# Patient Record
Sex: FEMALE | Race: BLACK OR AFRICAN AMERICAN | Employment: UNEMPLOYED | URBAN - METROPOLITAN AREA
[De-identification: names, ages, dates, MRNs, and addresses within clinical notes are randomized per-mention and may not be internally consistent; named-entity substitution may affect disease eponyms.]

---

## 2019-11-20 PROBLEM — Z34.81 MULTIGRAVIDA IN FIRST TRIMESTER: Status: ACTIVE | Noted: 2019-11-20

## 2019-11-20 PROBLEM — E07.9 THYROID DISEASE AFFECTING PREGNANCY: Status: ACTIVE | Noted: 2019-11-20

## 2019-11-20 PROBLEM — O99.280 THYROID DISEASE AFFECTING PREGNANCY: Status: ACTIVE | Noted: 2019-11-20

## 2019-11-22 PROBLEM — O09.899 RUBELLA NON-IMMUNE STATUS, ANTEPARTUM: Status: ACTIVE | Noted: 2019-11-22

## 2019-11-22 PROBLEM — O26.899 RH NEGATIVE STATE IN ANTEPARTUM PERIOD: Status: ACTIVE | Noted: 2019-11-22

## 2019-11-22 PROBLEM — Z67.91 RH NEGATIVE STATE IN ANTEPARTUM PERIOD: Status: ACTIVE | Noted: 2019-11-22

## 2019-11-22 PROBLEM — Z28.39 RUBELLA NON-IMMUNE STATUS, ANTEPARTUM: Status: ACTIVE | Noted: 2019-11-22

## 2019-12-17 PROBLEM — Z34.82 MULTIGRAVIDA IN SECOND TRIMESTER: Status: ACTIVE | Noted: 2019-11-20

## 2020-04-01 PROBLEM — Z34.83 MULTIGRAVIDA IN THIRD TRIMESTER: Status: ACTIVE | Noted: 2019-11-20

## 2020-05-22 ENCOUNTER — HOSPITAL ENCOUNTER (OUTPATIENT)
Age: 20
Discharge: HOME | End: 2020-05-22
Attending: OBSTETRICS & GYNECOLOGY | Admitting: OBSTETRICS & GYNECOLOGY
Payer: COMMERCIAL

## 2020-05-22 VITALS
SYSTOLIC BLOOD PRESSURE: 119 MMHG | RESPIRATION RATE: 20 BRPM | DIASTOLIC BLOOD PRESSURE: 73 MMHG | WEIGHT: 173 LBS | TEMPERATURE: 98 F | HEART RATE: 91 BPM | BODY MASS INDEX: 33.96 KG/M2 | HEIGHT: 60 IN

## 2020-05-22 PROBLEM — R10.9 ABDOMINAL PAIN DURING PREGNANCY IN THIRD TRIMESTER: Status: ACTIVE | Noted: 2020-05-22

## 2020-05-22 PROBLEM — O26.893 ABDOMINAL PAIN DURING PREGNANCY IN THIRD TRIMESTER: Status: ACTIVE | Noted: 2020-05-22

## 2020-05-22 LAB
GLUCOSE, GLUUPC: NEGATIVE
KETONES UR-MCNC: NEGATIVE MG/DL
PROT UR QL: NEGATIVE

## 2020-05-22 PROCEDURE — 99282 EMERGENCY DEPT VISIT SF MDM: CPT

## 2020-05-22 PROCEDURE — 59025 FETAL NON-STRESS TEST: CPT

## 2020-05-22 PROCEDURE — 74011250636 HC RX REV CODE- 250/636

## 2020-05-22 PROCEDURE — 81002 URINALYSIS NONAUTO W/O SCOPE: CPT | Performed by: OBSTETRICS & GYNECOLOGY

## 2020-05-22 RX ORDER — TERBUTALINE SULFATE 1 MG/ML
0.25 INJECTION SUBCUTANEOUS
Status: COMPLETED | OUTPATIENT
Start: 2020-05-22 | End: 2020-05-22

## 2020-05-22 RX ORDER — TERBUTALINE SULFATE 1 MG/ML
INJECTION SUBCUTANEOUS
Status: COMPLETED
Start: 2020-05-22 | End: 2020-05-22

## 2020-05-22 RX ADMIN — TERBUTALINE SULFATE 0.25 MG: 1 INJECTION SUBCUTANEOUS at 22:38

## 2020-05-23 NOTE — PROGRESS NOTES
Contractions have stopped. Pt given discharge instructions and verbalized understanding. Will return to [facility] for SROM, returned pain, heavy bleeding or decreased fetal movement.  Ambulated downstairs to home

## 2020-05-23 NOTE — PROGRESS NOTES
5/22/2020      RE: Mansi Apple      To Whom it May Concern: This is to certify that Mansi Apple may seen in our hospital on 5/22/2020. Please excuse her from work 5/22 and 5/23. She may return to work after the 23rd as scheduled. .  Thank you for your assistance in this matter.     Sincerely,      Bee Delgado RN for Dr. Kadi Mcdonald MD

## 2020-05-23 NOTE — PROGRESS NOTES
Pt arrived in [facility]. States she has been having contractions all day and now that she is here feels like they are less. Rates pain during contractions to be 3-4. Denies leaking of fluid but did have some bloody show. States has not been checked in the office. Was last seen last Thursday a week ago.  Placed on M

## 2020-05-23 NOTE — DISCHARGE INSTRUCTIONS
Patient Education        Belly Pain in Pregnancy: Care Instructions  Your Care Instructions    When you're pregnant, any belly pain can be a worry. You may not want to call your doctor about every pain you have. But you don't want to miss something that is dangerous for you or your baby. Even if it feels familiar, belly pain can mean something new when you're pregnant. It's important to know when to call your doctor. It will also help to know how to care for yourself at home when your pain is not caused by anything harmful. · When belly pain is more severe or constant, see a doctor right away. · If you're sure your belly pain is a sign of labor, call your doctor. · When belly pain is brief, it's usually a normal part of pregnancy. It might be related to changes in the growing uterus. Or it could be the stretching of ligaments called round ligaments. These ligaments help support the uterus. Round ligament pain can be on either side of your belly. It can also be felt in your hips or groin. Follow-up care is a key part of your treatment and safety. Be sure to make and go to all appointments, and call your doctor if you are having problems. It's also a good idea to know your test results and keep a list of the medicines you take. How can you tell if belly pain is a sign of labor? When belly pain is caused by labor, it can feel like mild or menstrual-like cramps in your lower belly. These cramps are probably contractions. They can happen in your second or third trimester. You may also have:  · A steady, dull ache in your lower back, pelvis, or thighs. · A feeling of pressure in your pelvis or lower belly. · Changes in your vaginal discharge or a sudden release of fluid from the vagina. If you think you are in labor, call your doctor. How can you care for yourself at home? When belly pain is mild and is not a symptom of labor:  · Rest until you feel better. · Take a warm bath.   · Think about what you drink and eat:  ? Drink plenty of fluids. Choose water and other caffeine-free clear liquids until you feel better. ? Try eating small, frequent meals. If your stomach is upset, try bland, low-fat foods like plain rice, broiled chicken, toast, and yogurt. · Think about how you move if you are having brief pains from stretching of the round ligaments. ? Try gentle stretching. ? Move a little more slowly when turning in bed or getting up from a chair, so those ligaments don't stretch quickly. ? Lean forward a bit if you think you are going to cough or sneeze. When should you call for help? Call 911 anytime you think you may need emergency care. For example, call if:    · You have sudden, severe pain in your belly.     · You have severe vaginal bleeding.     · You passed out (lost consciousness).     · You have a seizure.    Call your doctor now or seek immediate medical care if:    · You have new or worse belly pain or cramping.     · You have any vaginal bleeding.     · You have a fever.     · You have symptoms of preeclampsia, such as:  ? Sudden swelling of your face, hands, or feet. ? New vision problems (such as dimness, blurring, or seeing spots). ? A severe headache.     · You think that you may be in labor. This means that you've had at least 8 contractions within 1 hour or at least 4 contractions within 20 minutes, even after you change your position and drink fluids.     · You have symptoms of a urinary tract infection. These may include:  ? Pain or burning when you urinate. ? A frequent need to urinate without being able to pass much urine. ? Pain in the flank, which is just below the rib cage and above the waist on either side of the back. ? Blood in your urine.    Watch closely for changes in your health, and be sure to contact your doctor if you are worried about your or your baby's health. Where can you learn more?   Go to http://erinn-ricki.info/  Enter B275 in the search box to learn more about \"Belly Pain in Pregnancy: Care Instructions. \"  Current as of: May 29, 2019Content Version: 12.4  © 3514-2753 Healthwise, Incorporated. Care instructions adapted under license by [employer] (which disclaims liability or warranty for this information). If you have questions about a medical condition or this instruction, always ask your healthcare professional. [address] any warranty or liability for your use of this information. Patient Education   Patient Education   Patient Education        Weeks 34 to 39 of Your Pregnancy: Care Instructions  Your Care Instructions    By now, your baby and your belly have grown quite large. It is almost time to give birth. Your baby's lungs are almost ready to breathe air. The bones in your baby's head are now firm enough to protect it, but soft enough to move down through the birth canal.  You may feel excited, happy, anxious, or scared. You may wonder how you will know if you are in labor or what to expect during labor. Try to be flexible in your expectations of the birth. Because each birth is different, there is no way to know exactly what childbirth will be like for you. This care sheet will help you know what to expect and how to prepare. This may make your childbirth easier. If you haven't already had the Tdap shot during this pregnancy, talk to your doctor about getting it. It will help protect your  against pertussis infection. In the 36th week, most women have a test for group B streptococcus (GBS). GBS is a common bacteria that can live in the vagina and rectum. It can make your baby sick after birth. If you test positive, you will get antibiotics during labor. The medicine will keep your baby from getting the bacteria. Follow-up care is a key part of your treatment and safety. Be sure to make and go to all appointments, and call your doctor if you are having problems.  It's also a good idea to know your test results and keep a list of the medicines you take. How can you care for yourself at home? Learn about pain relief choices  · Pain is different for every woman. Talk with your doctor about your feelings about pain. · You can choose from several types of pain relief. These include medicine or breathing techniques, as well as comfort measures. You can use more than one option. · If you choose to have pain medicine during labor, talk to your doctor about your options. Some medicines lower anxiety and help with some of the pain. Others make your lower body numb so that you won't feel pain. · Be sure to tell your doctor about your pain medicine choice before you start labor or very early in your labor. You may be able to change your mind as labor progresses. · Rarely, a woman is put to sleep by medicine given through a mask or an IV. Labor and delivery  · The first stage of labor has three parts: early, active, and transition. ? Most women have early labor at home. You can stay busy or rest, eat light snacks, drink clear fluids, and start counting contractions. ? When talking during a contraction gets hard, you may be moving to active labor. During active labor, you should head for the hospital if you are not there already. ? You are in active labor when contractions come every 3 to 4 minutes and last about 60 seconds. Your cervix is opening more rapidly. ? If your water breaks, contractions will come faster and stronger. ? During transition, your cervix is stretching, and contractions are coming more rapidly. ? You may want to push, but your cervix might not be ready. Your doctor will tell you when to push. · The second stage starts when your cervix is completely opened and you are ready to push. ? Contractions are very strong to push the baby down the birth canal.  ? You will feel the urge to push. You may feel like you need to have a bowel movement.   ? You may be coached to push with contractions. These contractions will be very strong, but you will not have them as often. You can get a little rest between contractions. ? You may be emotional and irritable. You may not be aware of what is going on around you.  ? One last push, and your baby is born. · The third stage is when a few more contractions push out the placenta. This may take 30 minutes or less. · The fourth stage is the welcome recovery. You may feel overwhelmed with emotions and exhausted but alert. This is a good time to start breastfeeding. Where can you learn more? Go to http://erinnKidAdmitricki.info/  Enter B912 in the search box to learn more about \"Weeks 34 to 36 of Your Pregnancy: Care Instructions. \"  Current as of: May 29, 2019Content Version: 12.4  © 9419-7531 Healthwise, Incorporated. Care instructions adapted under license by [employer] (which disclaims liability or warranty for this information). If you have questions about a medical condition or this instruction, always ask your healthcare professional. [place] [address] any warranty or liability for your use of this information. Counting Your Baby's Kicks: Care Instructions  Your Care Instructions    Counting your baby's kicks is one way your doctor can tell that your baby is healthy. Most women--especially in a first pregnancy--feel their baby move for the first time between 16 and 22 weeks. The movement may feel like flutters rather than kicks. Your baby may move more at certain times of the day. When you are active, you may notice less kicking than when you are resting. At your prenatal visits, your doctor will ask whether the baby is active. In your last trimester, your doctor may ask you to count the number of times you feel your baby move. Follow-up care is a key part of your treatment and safety. Be sure to make and go to all appointments, and call your doctor if you are having problems.  It's also a good idea to know your test results and keep a list of the medicines you take. How do you count fetal kicks? · A common method of checking your baby's movement is to count the number of kicks or moves you feel in 1 hour. Ten movements (such as kicks, flutters, or rolls) in 1 hour are normal. Some doctors suggest that you count in the morning until you get to 10 movements. Then you can quit for that day and start again the next day. · Pick your baby's most active time of day to count. This may be any time from morning to evening. · If you do not feel 10 movements in an hour, your baby may be sleeping. Wait for the next hour and count again. When should you call for help? Call your doctor now or seek immediate medical care if:    · You noticed that your baby has stopped moving or is moving much less than normal.    Watch closely for changes in your health, and be sure to contact your doctor if you have any problems. Where can you learn more? Go to http://erinn-ricki.info/  Enter [id number] in the search box to learn more about \"Counting Your Baby's Kicks: Care Instructions. \"  Current as of: May 29, 2019Content Version: 12.4  © 2181-9694 Healthwise, Incorporated. Care instructions adapted under license by [employer] (which disclaims liability or warranty for this information). If you have questions about a medical condition or this instruction, always ask your healthcare professional. [place] [address] any warranty or liability for your use of this information. Cassandra Console Contractions: Care Instructions  Your Care Instructions    Culberson Lopez contractions prepare your uterus for labor. Think of them as a \"warm-up\" exercise that your body does. You may begin to feel them between the 28th and 30th weeks of your pregnancy. But they start as early as the 20th week. Calos Lopez contractions usually occur more often during the ninth month.  They may go away when you are active and return when you rest. These contractions are like mild contractions of true labor, but they occur less often. (You feel fewer than 8 in an hour.) They don't cause your cervix to open. It may be hard for you to tell the difference between [facility] contractions and true labor, especially in your first pregnancy. Follow-up care is a key part of your treatment and safety. Be sure to make and go to all appointments, and call your doctor if you are having problems. It's also a good idea to know your test results and keep a list of the medicines you take. How can you care for yourself at home? · Try a warm bath to help relieve muscle tension and reduce pain. · Change positions every 30 minutes. Take breaks if you must sit for a long time. Get up and walk around. · Drink plenty of water, enough so that your urine is light yellow or clear like water. · Taking short walks may help you feel better. Your doctor needs to check any contractions that are getting stronger or closer together. Where can you learn more? Go to http://erinn-ricki.info/  Enter Z402 in the search box to learn more about \"Lewisberry Lopez Contractions: Care Instructions. \"  Current as of: May 29, 2019Content Version: 12.4  © 4499-9147 Healthwise, Incorporated. Care instructions adapted under license by [employer] (which disclaims liability or warranty for this information). If you have questions about a medical condition or this instruction, always ask your healthcare professional. [place] [address] any warranty or liability for your use of this information.

## 2020-05-23 NOTE — H&P
Chief Complaint: ctx      23 y.o. female  at 29w2d who presents with several hours of irregular uterine ctx. Pt notes good FM. She denies VB, LOF. UTI or PEC symptoms. Pt denies any symptoms of or exposure to the COVID-19 virus. HISTORY:    Social History     Substance and Sexual Activity   Sexual Activity Yes     Patient's last menstrual period was 2019.     Social History     Socioeconomic History    Marital status: UNKNOWN     Spouse name: Not on file    Number of children: Not on file    Years of education: Not on file    Highest education level: Not on file   Occupational History    Not on file   Social Needs    Financial resource strain: Not on file    Food insecurity     Worry: Not on file     Inability: Not on file    Transportation needs     Medical: Not on file     Non-medical: Not on file   Tobacco Use    Smoking status: Never Smoker    Smokeless tobacco: Never Used   Substance and Sexual Activity    Alcohol use: Not Currently    Drug use: Never    Sexual activity: Yes   Lifestyle    Physical activity     Days per week: Not on file     Minutes per session: Not on file    Stress: Not on file   Relationships    Social connections     Talks on phone: Not on file     Gets together: Not on file     Attends Sabianism service: Not on file     Active member of club or organization: Not on file     Attends meetings of clubs or organizations: Not on file     Relationship status: Not on file    Intimate partner violence     Fear of current or ex partner: Not on file     Emotionally abused: Not on file     Physically abused: Not on file     Forced sexual activity: Not on file   Other Topics Concern     Service Not Asked    Blood Transfusions Not Asked    Caffeine Concern No    Occupational Exposure Not Asked   [place] Hazards Not Asked    Sleep Concern Not Asked    Stress Concern Not Asked    Weight Concern Not Asked    Special Diet Not Asked    Back Care Not Asked  Exercise No    Bike Helmet Not Asked    Seat Belt No    Self-Exams No   Social History Narrative    Abuse: Feels safe at home, no history of physical abuse, no history of sexual abuse       Past Surgical History:   Procedure Laterality Date    HX DILATION AND CURETTAGE  06/2019       Past Medical History:   Diagnosis Date    Hashimoto's thyroiditis          ROS:  An 8 point review of symptoms negative except for chief complaint as described above. PHYSICAL EXAM:  Blood pressure 119/73, pulse 91, temperature 98 °F (36.7 °C), resp. rate 20, height 5' (1.524 m), weight 78.5 kg (173 lb), last menstrual period 09/16/2019. Constitutional: The patient appears well, alert, oriented x 3. Cardiovascular: Heart RRR, no murmurs. Respiratory: Lungs clear, no respiratory distress  GI: Abdomen soft, nontender, no guarding  No fundal tenderness  Musculoskeletal: no cva tenderness  Lower ext: no edema, neg ciara's, reflexes +2  Skin: no rashes or lesions  Psychiatric:Mood/ Affect: appropriate  Genitourinary: SVE: closed/50%/vtx/-3  FHT: Category 1 with mod variability and + accels  TOCO: Irregular ctx    I personally reviewed pt's medical record including relevant labs and ultrasounds  I reviewed the NST at today's encounter    Assessment/Plan:  Pt presents with false labor. Administer SQ terbutaline. Once the ctx resolve will discharge to home with PTL precautions. Pt to f/u with her PObP.

## 2020-05-26 PROBLEM — R10.9 ABDOMINAL PAIN DURING PREGNANCY IN THIRD TRIMESTER: Status: RESOLVED | Noted: 2020-05-22 | Resolved: 2020-05-26

## 2020-05-26 PROBLEM — O26.893 ABDOMINAL PAIN DURING PREGNANCY IN THIRD TRIMESTER: Status: RESOLVED | Noted: 2020-05-22 | Resolved: 2020-05-26

## 2020-06-01 PROBLEM — O99.820 GBS (GROUP B STREPTOCOCCUS CARRIER), +RV CULTURE, CURRENTLY PREGNANT: Status: ACTIVE | Noted: 2020-06-01

## 2020-06-16 ENCOUNTER — HOSPITAL ENCOUNTER (OUTPATIENT)
Age: 20
Discharge: HOME | DRG: 560 | End: 2020-06-16
Attending: OBSTETRICS & GYNECOLOGY | Admitting: OBSTETRICS & GYNECOLOGY
Payer: COMMERCIAL

## 2020-06-16 VITALS
TEMPERATURE: 98.6 F | RESPIRATION RATE: 18 BRPM | SYSTOLIC BLOOD PRESSURE: 133 MMHG | HEART RATE: 86 BPM | BODY MASS INDEX: 35.14 KG/M2 | WEIGHT: 179 LBS | DIASTOLIC BLOOD PRESSURE: 87 MMHG | HEIGHT: 60 IN

## 2020-06-16 DIAGNOSIS — Z34.83 MULTIGRAVIDA IN THIRD TRIMESTER: ICD-10-CM

## 2020-06-16 DIAGNOSIS — O99.820 GBS (GROUP B STREPTOCOCCUS CARRIER), +RV CULTURE, CURRENTLY PREGNANT: ICD-10-CM

## 2020-06-16 PROBLEM — O26.893 ABDOMINAL PAIN DURING PREGNANCY IN THIRD TRIMESTER: Status: ACTIVE | Noted: 2020-06-16

## 2020-06-16 PROBLEM — R10.9 ABDOMINAL PAIN DURING PREGNANCY IN THIRD TRIMESTER: Status: ACTIVE | Noted: 2020-06-16

## 2020-06-16 PROCEDURE — 59025 FETAL NON-STRESS TEST: CPT

## 2020-06-16 PROCEDURE — 99282 EMERGENCY DEPT VISIT SF MDM: CPT

## 2020-06-16 NOTE — PROGRESS NOTES
Reactive NST obtained. Discharge instructions reviewed. Pt verbalizes understanding. Pt discharged home in stable condition wit sig other at side.

## 2020-06-16 NOTE — PROGRESS NOTES
Dr Nupur Melgar at bedside. Strip reviewed. SVE 1-2. Orders to discharge pt home with labor precautions.

## 2020-06-16 NOTE — H&P
Chief Complaint: ctx      23 y.o. female  at 39w1d  weeks gestation who is seen for several hours of painful uterine ctx, although the ctx have become irregular since arriving at the [facility]. Pt notes good FM. She denies VB, LOF, UTI, PEC or COVID-19 symptoms. Pt was checked in the office yesterday and was 1cm/60%. HISTORY:    Social History     Substance and Sexual Activity   Sexual Activity Yes     Patient's last menstrual period was 2019.     Social History     Socioeconomic History    Marital status: UNKNOWN     Spouse name: Not on file    Number of children: Not on file    Years of education: Not on file    Highest education level: Not on file   Occupational History    Not on file   Social Needs    Financial resource strain: Not on file    Food insecurity     Worry: Not on file     Inability: Not on file    Transportation needs     Medical: Not on file     Non-medical: Not on file   Tobacco Use    Smoking status: Never Smoker    Smokeless tobacco: Never Used   Substance and Sexual Activity    Alcohol use: Not Currently    Drug use: Never    Sexual activity: Yes   Lifestyle    Physical activity     Days per week: Not on file     Minutes per session: Not on file    Stress: Not on file   Relationships    Social connections     Talks on phone: Not on file     Gets together: Not on file     Attends Denominational service: Not on file     Active member of club or organization: Not on file     Attends meetings of clubs or organizations: Not on file     Relationship status: Not on file    Intimate partner violence     Fear of current or ex partner: Not on file     Emotionally abused: Not on file     Physically abused: Not on file     Forced sexual activity: Not on file   Other Topics Concern     Service Not Asked    Blood Transfusions Not Asked    Caffeine Concern No    Occupational Exposure Not Asked   [place] Hazards Not Asked    Sleep Concern Not Asked    Stress Concern Not Asked    Weight Concern Not Asked    Special Diet Not Asked    Back Care Not Asked    Exercise No    Bike Helmet Not Asked    Seat Belt No    Self-Exams No   Social History Narrative    Abuse: Feels safe at home, no history of physical abuse, no history of sexual abuse       Past Surgical History:   Procedure Laterality Date    HX DILATION AND CURETTAGE  06/2019       Past Medical History:   Diagnosis Date    Hashimoto's thyroiditis          ROS:  An 8 point review of symptoms negative except for chief complaint as described above. PHYSICAL EXAM:  Blood pressure 133/87, pulse 86, temperature 98.6 °F (37 °C), resp. rate 18, height 5' (1.524 m), weight 81.2 kg (179 lb), last menstrual period 09/16/2019. Constitutional: The patient appears well, alert, oriented x 3. Cardiovascular: Heart RRR, no murmurs. Respiratory: Lungs clear, no respiratory distress  GI: Abdomen soft, nontender, no guarding  No fundal tenderness  Musculoskeletal: no cva tenderness  Lower ext: no edema, neg ciara's, reflexes +2  Skin: no rashes or lesions  Psychiatric:Mood/ Affect: appropriate  Genitourinary: SVE: 1cm/60%/vtx/-2  FHT: Category 1 with mod variability and + accels; reactive  TOCO: irregular ctx    I personally reviewed pt's medical record including relevant labs and ultrasounds  I reviewed the NST at today's encounter    Assessment/Plan:  Pt presents with false labor. Pt discharged to home with labor precautions. Pt to f/u with her PObP.

## 2020-06-16 NOTE — DISCHARGE INSTRUCTIONS
Patient Education   Patient Education        Pregnancy Precautions: Care Instructions  Your Care Instructions     There is no sure way to prevent labor before your due date ( labor) or to prevent most other pregnancy problems. But there are things you can do to increase your chances of a healthy pregnancy. Go to your appointments, follow your doctor's advice, and take good care of yourself. Eat well, and exercise (if your doctor agrees). And make sure to drink plenty of water. Follow-up care is a key part of your treatment and safety. Be sure to make and go to all appointments, and call your doctor if you are having problems. It's also a good idea to know your test results and keep a list of the medicines you take. How can you care for yourself at home? · Make sure you go to your prenatal appointments. At each visit, your doctor will check your blood pressure. Your doctor will also check to see if you have protein in your urine. High blood pressure and protein in urine are signs of preeclampsia. This condition can be dangerous for you and your baby. · Drink plenty of fluids, enough so that your urine is light yellow or clear like water. Dehydration can cause contractions. If you have kidney, heart, or liver disease and have to limit fluids, talk with your doctor before you increase the amount of fluids you drink. · Tell your doctor right away if you notice any symptoms of an infection, such as:  ? Burning when you urinate. ? A foul-smelling discharge from your vagina. ? Vaginal itching. ? Unexplained fever. ? Unusual pain or soreness in your uterus or lower belly. · Eat a balanced diet. Include plenty of foods that are high in calcium and iron. ? Foods high in calcium include milk, cheese, yogurt, almonds, and broccoli. ? Foods high in iron include red meat, shellfish, poultry, eggs, beans, raisins, whole-grain bread, and leafy green vegetables. · Do not smoke.  If you need help quitting, talk to your doctor about stop-smoking programs and medicines. These can increase your chances of quitting for good. · Do not drink alcohol or use illegal drugs. · Follow your doctor's directions about activity. Your doctor will let you know how much, if any, exercise you can do. · Ask your doctor if you can have sex. If you are at risk for early labor, your doctor may ask you to not have sex. · Take care to prevent falls. During pregnancy, your joints are loose, and your balance is off. Sports such as bicycling, skiing, or in-line skating can increase your risk of falling. And don't ride horses or motorcycles, dive, water ski, scuba dive, or parachute jump while you are pregnant. · Avoid getting very hot. Do not use saunas or hot tubs. Avoid staying out in the sun in hot weather for long periods. Take acetaminophen (Tylenol) to lower a high fever. · Do not take any over-the-counter or herbal medicines or supplements without talking to your doctor or pharmacist first.  When should you call for help? Call 911 anytime you think you may need emergency care. For example, call if:  · You passed out (lost consciousness). · You have a seizure. · You have severe vaginal bleeding. · You have severe pain in your belly or pelvis. · You have had fluid gushing or leaking from your vagina and you know or think the umbilical cord is bulging into your vagina. If this happens, immediately get down on your knees so your rear end (buttocks) is higher than your head. This will decrease the pressure on the cord until help arrives. Call your doctor now or seek immediate medical care if:  · You have signs of preeclampsia, such as:  ? Sudden swelling of your face, hands, or feet. ? New vision problems (such as dimness, blurring, or seeing spots). ? A severe headache. · You have any vaginal bleeding. · You have belly pain or cramping. · You have a fever. · You have had regular contractions (with or without pain) for an hour.  This means that you have 8 or more within 1 hour or 4 or more in 20 minutes after you change your position and drink fluids. · You have a sudden release of fluid from your vagina. · You have low back pain or pelvic pressure that does not go away. · You notice that your baby has stopped moving or is moving much less than normal.  Watch closely for changes in your health, and be sure to contact your doctor if you have any problems. Where can you learn more? Go to http://erinn-ricki.info/  Enter Y951 in the search box to learn more about \"Pregnancy Precautions: Care Instructions. \"  Current as of: February 11, 2020               Content Version: 12.5  © 0440-7746 [employer]. Care instructions adapted under license by [employer] (which disclaims liability or warranty for this information). If you have questions about a medical condition or this instruction, always ask your healthcare professional. [place] [address] any warranty or liability for your use of this information. Counting Your Baby's Kicks: Care Instructions  Your Care Instructions     Counting your baby's kicks is one way your doctor can tell that your baby is healthy. Most women--especially in a first pregnancy--feel their baby move for the first time between 16 and 22 weeks. The movement may feel like flutters rather than kicks. Your baby may move more at certain times of the day. When you are active, you may notice less kicking than when you are resting. At your prenatal visits, your doctor will ask whether the baby is active. In your last trimester, your doctor may ask you to count the number of times you feel your baby move. Follow-up care is a key part of your treatment and safety. Be sure to make and go to all appointments, and call your doctor if you are having problems. It's also a good idea to know your test results and keep a list of the medicines you take.   How do you count fetal kicks? · A common method of checking your baby's movement is to count the number of kicks or moves you feel in 1 hour. Ten movements (such as kicks, flutters, or rolls) in 1 hour are normal. Some doctors suggest that you count in the morning until you get to 10 movements. Then you can quit for that day and start again the next day. · Pick your baby's most active time of day to count. This may be any time from morning to evening. · If you do not feel 10 movements in an hour, your baby may be sleeping. Wait for the next hour and count again. When should you call for help? Call your doctor now or seek immediate medical care if:  · You noticed that your baby has stopped moving or is moving much less than normal.  Watch closely for changes in your health, and be sure to contact your doctor if you have any problems. Where can you learn more? Go to http://erinn-ricki.info/  Enter [id number] in the search box to learn more about \"Counting Your Baby's Kicks: Care Instructions. \"  Current as of: February 11, 2020               Content Version: 12.5  © 3816-9713 [employer]. Care instructions adapted under license by [employer] (which disclaims liability or warranty for this information). If you have questions about a medical condition or this instruction, always ask your healthcare professional. [address] any warranty or liability for your use of this information.

## 2020-06-16 NOTE — PROGRESS NOTES
Pt presented to [facility] complaining of uterine contractions. EFM on. Dr Clay Mcdaniels notified. Fetal Monitor strip not transferring over to QS. Will send hard copy of strip to medical records.

## 2020-06-17 ENCOUNTER — HOSPITAL ENCOUNTER (INPATIENT)
Age: 20
LOS: 2 days | Discharge: HOME | DRG: 560 | End: 2020-06-19
Attending: OBSTETRICS & GYNECOLOGY | Admitting: OBSTETRICS & GYNECOLOGY
Payer: COMMERCIAL

## 2020-06-17 ENCOUNTER — ANESTHESIA (OUTPATIENT)
Dept: LABOR AND DELIVERY | Age: 20
DRG: 560 | End: 2020-06-17
Payer: COMMERCIAL

## 2020-06-17 ENCOUNTER — ANESTHESIA EVENT (OUTPATIENT)
Dept: LABOR AND DELIVERY | Age: 20
DRG: 560 | End: 2020-06-17
Payer: COMMERCIAL

## 2020-06-17 DIAGNOSIS — Z37.9 NORMAL LABOR: ICD-10-CM

## 2020-06-17 LAB
ABO + RH BLD: NORMAL
ALBUMIN SERPL-MCNC: 2.4 G/DL (ref 3.5–5)
ALBUMIN/GLOB SERPL: 0.7 {RATIO} (ref 1.2–3.5)
ALP SERPL-CCNC: 136 U/L (ref 50–136)
ALT SERPL-CCNC: 13 U/L (ref 12–65)
ANION GAP SERPL CALC-SCNC: 6 MMOL/L (ref 7–16)
ARTERIAL PATENCY WRIST A: ABNORMAL
ARTERIAL PATENCY WRIST A: ABNORMAL
AST SERPL-CCNC: 13 U/L (ref 15–37)
BASE DEFICIT BLD-SCNC: 3 MMOL/L
BASE DEFICIT BLD-SCNC: 4 MMOL/L
BDY SITE: ABNORMAL
BDY SITE: ABNORMAL
BILIRUB SERPL-MCNC: 0.4 MG/DL (ref 0.2–1.1)
BLOOD GROUP ANTIBODIES SERPL: NORMAL
BUN SERPL-MCNC: 3 MG/DL (ref 6–23)
CA-I BLD-MCNC: 1.58 MMOL/L (ref 1.12–1.32)
CA-I BLD-MCNC: 1.62 MMOL/L (ref 1.12–1.32)
CALCIUM SERPL-MCNC: 8.5 MG/DL (ref 8.3–10.4)
CHLORIDE SERPL-SCNC: 109 MMOL/L (ref 98–107)
CO2 SERPL-SCNC: 23 MMOL/L (ref 21–32)
COLLECT TIME,HTIME: 1924
COLLECT TIME,HTIME: 1924
CREAT SERPL-MCNC: 0.65 MG/DL (ref 0.6–1)
ERYTHROCYTE [DISTWIDTH] IN BLOOD BY AUTOMATED COUNT: 13.7 % (ref 11.9–14.6)
GAS FLOW.O2 O2 DELIVERY SYS: ABNORMAL L/MIN
GAS FLOW.O2 O2 DELIVERY SYS: ABNORMAL L/MIN
GLOBULIN SER CALC-MCNC: 3.6 G/DL (ref 2.3–3.5)
GLUCOSE BLD STRIP.AUTO-MCNC: 92 MG/DL (ref 65–100)
GLUCOSE BLD STRIP.AUTO-MCNC: 94 MG/DL (ref 65–100)
GLUCOSE SERPL-MCNC: 93 MG/DL (ref 65–100)
GLUCOSE, GLUUPC: NEGATIVE
HCO3 BLD-SCNC: 21.6 MMOL/L (ref 22–26)
HCO3 BLD-SCNC: 21.8 MMOL/L (ref 22–26)
HCT VFR BLD AUTO: 36.7 % (ref 35.8–46.3)
HGB BLD-MCNC: 12 G/DL (ref 11.7–15.4)
KETONES UR-MCNC: NORMAL MG/DL
MCH RBC QN AUTO: 27.6 PG (ref 26.1–32.9)
MCHC RBC AUTO-ENTMCNC: 32.7 G/DL (ref 31.4–35)
MCV RBC AUTO: 84.6 FL (ref 79.6–97.8)
NRBC # BLD: 0 K/UL (ref 0–0.2)
O2/TOTAL GAS SETTING VFR VENT: 21 %
O2/TOTAL GAS SETTING VFR VENT: 21 %
PCO2 BLD: 39.1 MMHG (ref 35–45)
PCO2 BLD: 39.4 MMHG (ref 35–45)
PH BLD: 7.35 [PH] (ref 7.35–7.45)
PH BLD: 7.35 [PH] (ref 7.35–7.45)
PLATELET # BLD AUTO: 120 K/UL (ref 150–450)
PMV BLD AUTO: 13.1 FL (ref 9.4–12.3)
PO2 BLD: 44 MMHG (ref 75–100)
PO2 BLD: 51 MMHG (ref 75–100)
POTASSIUM BLD-SCNC: 4.6 MMOL/L (ref 3.5–5.1)
POTASSIUM BLD-SCNC: 4.7 MMOL/L (ref 3.5–5.1)
POTASSIUM SERPL-SCNC: 3.6 MMOL/L (ref 3.5–5.1)
PROT SERPL-MCNC: 6 G/DL (ref 6.3–8.2)
PROT UR QL: NEGATIVE
RBC # BLD AUTO: 4.34 M/UL (ref 4.05–5.2)
SAO2 % BLD: 78 % (ref 95–98)
SAO2 % BLD: 84 % (ref 95–98)
SERVICE CMNT-IMP: ABNORMAL
SERVICE CMNT-IMP: ABNORMAL
SODIUM BLD-SCNC: 138 MMOL/L (ref 136–145)
SODIUM BLD-SCNC: 140 MMOL/L (ref 136–145)
SODIUM SERPL-SCNC: 138 MMOL/L (ref 136–145)
SPECIMEN EXP DATE BLD: NORMAL
SPECIMEN TYPE: ABNORMAL
SPECIMEN TYPE: ABNORMAL
WBC # BLD AUTO: 8.1 K/UL (ref 4.3–11.1)

## 2020-06-17 PROCEDURE — 82947 ASSAY GLUCOSE BLOOD QUANT: CPT

## 2020-06-17 PROCEDURE — 74011000258 HC RX REV CODE- 258: Performed by: OBSTETRICS & GYNECOLOGY

## 2020-06-17 PROCEDURE — 74011000250 HC RX REV CODE- 250: Performed by: ANESTHESIOLOGY

## 2020-06-17 PROCEDURE — A4300 CATH IMPL VASC ACCESS PORTAL: HCPCS | Performed by: ANESTHESIOLOGY

## 2020-06-17 PROCEDURE — 74011250636 HC RX REV CODE- 250/636: Performed by: REGISTERED NURSE

## 2020-06-17 PROCEDURE — 99284 EMERGENCY DEPT VISIT MOD MDM: CPT

## 2020-06-17 PROCEDURE — 80053 COMPREHEN METABOLIC PANEL: CPT

## 2020-06-17 PROCEDURE — 75410000003 HC RECOV DEL/VAG/CSECN EA 0.5 HR

## 2020-06-17 PROCEDURE — 77010026065 HC OXYGEN MINIMUM MEDICAL AIR

## 2020-06-17 PROCEDURE — 81002 URINALYSIS NONAUTO W/O SCOPE: CPT | Performed by: OBSTETRICS & GYNECOLOGY

## 2020-06-17 PROCEDURE — 77030034696 HC CATH URETH FOL 2W BARD -A

## 2020-06-17 PROCEDURE — 77030003028 HC SUT VCRL J&J -A

## 2020-06-17 PROCEDURE — 76060000078 HC EPIDURAL ANESTHESIA

## 2020-06-17 PROCEDURE — 65270000029 HC RM PRIVATE

## 2020-06-17 PROCEDURE — 86900 BLOOD TYPING SEROLOGIC ABO: CPT

## 2020-06-17 PROCEDURE — 00HU33Z INSERTION OF INFUSION DEVICE INTO SPINAL CANAL, PERCUTANEOUS APPROACH: ICD-10-PCS | Performed by: ANESTHESIOLOGY

## 2020-06-17 PROCEDURE — 77030014125 HC TY EPDRL BBMI -B: Performed by: ANESTHESIOLOGY

## 2020-06-17 PROCEDURE — 74011250637 HC RX REV CODE- 250/637: Performed by: OBSTETRICS & GYNECOLOGY

## 2020-06-17 PROCEDURE — 82803 BLOOD GASES ANY COMBINATION: CPT

## 2020-06-17 PROCEDURE — 4A1HXCZ MONITORING OF PRODUCTS OF CONCEPTION, CARDIAC RATE, EXTERNAL APPROACH: ICD-10-PCS | Performed by: OBSTETRICS & GYNECOLOGY

## 2020-06-17 PROCEDURE — 0KQM0ZZ REPAIR PERINEUM MUSCLE, OPEN APPROACH: ICD-10-PCS | Performed by: OBSTETRICS & GYNECOLOGY

## 2020-06-17 PROCEDURE — 75410000000 HC DELIVERY VAGINAL/SINGLE

## 2020-06-17 PROCEDURE — 77030011945 HC CATH URIN INT ST MENT -A

## 2020-06-17 PROCEDURE — 74011250636 HC RX REV CODE- 250/636: Performed by: OBSTETRICS & GYNECOLOGY

## 2020-06-17 PROCEDURE — 59409 OBSTETRICAL CARE: CPT | Performed by: OBSTETRICS & GYNECOLOGY

## 2020-06-17 PROCEDURE — 77030018846 HC SOL IRR STRL H20 ICUM -A

## 2020-06-17 PROCEDURE — 75410000002 HC LABOR FEE PER 1 HR

## 2020-06-17 PROCEDURE — 74011250636 HC RX REV CODE- 250/636: Performed by: ANESTHESIOLOGY

## 2020-06-17 PROCEDURE — 85027 COMPLETE CBC AUTOMATED: CPT

## 2020-06-17 PROCEDURE — 99283 EMERGENCY DEPT VISIT LOW MDM: CPT | Performed by: OBSTETRICS & GYNECOLOGY

## 2020-06-17 RX ORDER — SODIUM CHLORIDE 0.9 % (FLUSH) 0.9 %
5-40 SYRINGE (ML) INJECTION EVERY 8 HOURS
Status: DISCONTINUED | OUTPATIENT
Start: 2020-06-17 | End: 2020-06-17 | Stop reason: HOSPADM

## 2020-06-17 RX ORDER — FENTANYL CITRATE 50 UG/ML
INJECTION, SOLUTION INTRAMUSCULAR; INTRAVENOUS AS NEEDED
Status: DISCONTINUED | OUTPATIENT
Start: 2020-06-17 | End: 2020-06-17 | Stop reason: HOSPADM

## 2020-06-17 RX ORDER — ROPIVACAINE HYDROCHLORIDE 2 MG/ML
INJECTION, SOLUTION EPIDURAL; INFILTRATION; PERINEURAL AS NEEDED
Status: DISCONTINUED | OUTPATIENT
Start: 2020-06-17 | End: 2020-06-17 | Stop reason: HOSPADM

## 2020-06-17 RX ORDER — ONDANSETRON 2 MG/ML
4 INJECTION INTRAMUSCULAR; INTRAVENOUS
Status: DISCONTINUED | OUTPATIENT
Start: 2020-06-17 | End: 2020-06-17 | Stop reason: HOSPADM

## 2020-06-17 RX ORDER — ROPIVACAINE HYDROCHLORIDE 2 MG/ML
INJECTION, SOLUTION EPIDURAL; INFILTRATION; PERINEURAL
Status: DISCONTINUED | OUTPATIENT
Start: 2020-06-17 | End: 2020-06-17 | Stop reason: HOSPADM

## 2020-06-17 RX ORDER — BUTORPHANOL TARTRATE 2 MG/ML
1 INJECTION INTRAMUSCULAR; INTRAVENOUS
Status: DISCONTINUED | OUTPATIENT
Start: 2020-06-17 | End: 2020-06-17 | Stop reason: HOSPADM

## 2020-06-17 RX ORDER — SODIUM CHLORIDE 0.9 % (FLUSH) 0.9 %
5-40 SYRINGE (ML) INJECTION AS NEEDED
Status: DISCONTINUED | OUTPATIENT
Start: 2020-06-17 | End: 2020-06-17 | Stop reason: HOSPADM

## 2020-06-17 RX ORDER — LIDOCAINE HYDROCHLORIDE AND EPINEPHRINE 15; 5 MG/ML; UG/ML
INJECTION, SOLUTION EPIDURAL AS NEEDED
Status: DISCONTINUED | OUTPATIENT
Start: 2020-06-17 | End: 2020-06-17 | Stop reason: HOSPADM

## 2020-06-17 RX ORDER — IBUPROFEN 600 MG/1
600 TABLET ORAL 3 TIMES DAILY
Status: DISCONTINUED | OUTPATIENT
Start: 2020-06-17 | End: 2020-06-19 | Stop reason: HOSPADM

## 2020-06-17 RX ORDER — DEXTROSE, SODIUM CHLORIDE, SODIUM LACTATE, POTASSIUM CHLORIDE, AND CALCIUM CHLORIDE 5; .6; .31; .03; .02 G/100ML; G/100ML; G/100ML; G/100ML; G/100ML
125 INJECTION, SOLUTION INTRAVENOUS CONTINUOUS
Status: DISCONTINUED | OUTPATIENT
Start: 2020-06-17 | End: 2020-06-17 | Stop reason: HOSPADM

## 2020-06-17 RX ORDER — OXYTOCIN/RINGER'S LACTATE 30/500 ML
250 PLASTIC BAG, INJECTION (ML) INTRAVENOUS ONCE
Status: DISCONTINUED | OUTPATIENT
Start: 2020-06-17 | End: 2020-06-17 | Stop reason: HOSPADM

## 2020-06-17 RX ORDER — BUTORPHANOL TARTRATE 2 MG/ML
1 INJECTION INTRAMUSCULAR; INTRAVENOUS
Status: DISCONTINUED | OUTPATIENT
Start: 2020-06-17 | End: 2020-06-17 | Stop reason: SDUPTHER

## 2020-06-17 RX ORDER — SODIUM CHLORIDE, SODIUM LACTATE, POTASSIUM CHLORIDE, CALCIUM CHLORIDE 600; 310; 30; 20 MG/100ML; MG/100ML; MG/100ML; MG/100ML
100 INJECTION, SOLUTION INTRAVENOUS CONTINUOUS
Status: DISCONTINUED | OUTPATIENT
Start: 2020-06-17 | End: 2020-06-18

## 2020-06-17 RX ORDER — MAG HYDROX/ALUMINUM HYD/SIMETH 200-200-20
30 SUSPENSION, ORAL (FINAL DOSE FORM) ORAL
Status: DISCONTINUED | OUTPATIENT
Start: 2020-06-17 | End: 2020-06-17 | Stop reason: HOSPADM

## 2020-06-17 RX ORDER — MINERAL OIL
120 OIL (ML) ORAL
Status: COMPLETED | OUTPATIENT
Start: 2020-06-17 | End: 2020-06-17

## 2020-06-17 RX ORDER — HYDROCODONE BITARTRATE AND ACETAMINOPHEN 5; 325 MG/1; MG/1
1 TABLET ORAL
Status: DISCONTINUED | OUTPATIENT
Start: 2020-06-17 | End: 2020-06-19 | Stop reason: HOSPADM

## 2020-06-17 RX ORDER — LIDOCAINE HYDROCHLORIDE 20 MG/ML
JELLY TOPICAL
Status: DISCONTINUED | OUTPATIENT
Start: 2020-06-17 | End: 2020-06-17 | Stop reason: HOSPADM

## 2020-06-17 RX ORDER — ROPIVACAINE HYDROCHLORIDE 5 MG/ML
INJECTION, SOLUTION EPIDURAL; INFILTRATION; PERINEURAL AS NEEDED
Status: DISCONTINUED | OUTPATIENT
Start: 2020-06-17 | End: 2020-06-17 | Stop reason: HOSPADM

## 2020-06-17 RX ORDER — PROMETHAZINE HYDROCHLORIDE 25 MG/ML
12.5 INJECTION, SOLUTION INTRAMUSCULAR; INTRAVENOUS ONCE
Status: COMPLETED | OUTPATIENT
Start: 2020-06-17 | End: 2020-06-17

## 2020-06-17 RX ORDER — LIDOCAINE HYDROCHLORIDE 10 MG/ML
1 INJECTION INFILTRATION; PERINEURAL
Status: DISCONTINUED | OUTPATIENT
Start: 2020-06-17 | End: 2020-06-17 | Stop reason: HOSPADM

## 2020-06-17 RX ORDER — OXYTOCIN/RINGER'S LACTATE 30/500 ML
1-25 PLASTIC BAG, INJECTION (ML) INTRAVENOUS
Status: DISCONTINUED | OUTPATIENT
Start: 2020-06-17 | End: 2020-06-18

## 2020-06-17 RX ADMIN — Medication 2 MILLI-UNITS/MIN: at 11:47

## 2020-06-17 RX ADMIN — SODIUM CHLORIDE, SODIUM LACTATE, POTASSIUM CHLORIDE, CALCIUM CHLORIDE, AND DEXTROSE MONOHYDRATE 125 ML/HR: 600; 310; 30; 20; 5 INJECTION, SOLUTION INTRAVENOUS at 10:47

## 2020-06-17 RX ADMIN — Medication 17 MILLI-UNITS/MIN: at 19:11

## 2020-06-17 RX ADMIN — MINERAL OIL 120 ML: 471.95 OIL ORAL at 18:29

## 2020-06-17 RX ADMIN — PROMETHAZINE HYDROCHLORIDE 12.5 MG: 25 INJECTION INTRAMUSCULAR; INTRAVENOUS at 09:50

## 2020-06-17 RX ADMIN — Medication 5 ML: at 13:48

## 2020-06-17 RX ADMIN — LIDOCAINE HYDROCHLORIDE,EPINEPHRINE BITARTRATE 2 ML: 15; .005 INJECTION, SOLUTION EPIDURAL; INFILTRATION; INTRACAUDAL; PERINEURAL at 13:44

## 2020-06-17 RX ADMIN — SODIUM CHLORIDE 2.5 MILLION UNITS: 9 INJECTION, SOLUTION INTRAVENOUS at 19:14

## 2020-06-17 RX ADMIN — ROPIVACAINE HYDROCHLORIDE 8 ML/HR: 2 INJECTION, SOLUTION EPIDURAL; INFILTRATION at 13:48

## 2020-06-17 RX ADMIN — SODIUM CHLORIDE 2.5 MILLION UNITS: 9 INJECTION, SOLUTION INTRAVENOUS at 15:14

## 2020-06-17 RX ADMIN — SODIUM CHLORIDE 5 MILLION UNITS: 900 INJECTION, SOLUTION INTRAVENOUS at 10:47

## 2020-06-17 RX ADMIN — ROPIVACAINE HYDROCHLORIDE 6 ML: 150 INJECTION, SOLUTION EPIDURAL; INFILTRATION; PERINEURAL at 17:52

## 2020-06-17 RX ADMIN — IBUPROFEN 600 MG: 600 TABLET, FILM COATED ORAL at 22:51

## 2020-06-17 RX ADMIN — BUTORPHANOL TARTRATE 1 MG: 2 INJECTION, SOLUTION INTRAMUSCULAR; INTRAVENOUS at 09:50

## 2020-06-17 RX ADMIN — SODIUM CHLORIDE, SODIUM LACTATE, POTASSIUM CHLORIDE, AND CALCIUM CHLORIDE 100 ML/HR: 600; 310; 30; 20 INJECTION, SOLUTION INTRAVENOUS at 09:49

## 2020-06-17 RX ADMIN — FENTANYL CITRATE 100 MCG: 50 INJECTION INTRAMUSCULAR; INTRAVENOUS at 13:45

## 2020-06-17 RX ADMIN — LIDOCAINE HYDROCHLORIDE,EPINEPHRINE BITARTRATE 3 ML: 15; .005 INJECTION, SOLUTION EPIDURAL; INFILTRATION; INTRACAUDAL; PERINEURAL at 13:43

## 2020-06-17 NOTE — PROGRESS NOTES
Sitting up for epidural   Dr Cortes Iha here for epidural  1344  Test dose  1350 laying down right tilt

## 2020-06-17 NOTE — H&P
History & Physical    Name: Susana Seip MRN: [id number]  SSN: [id number]    YOB: 2000  Age: 23 y.o. Sex: female      Chief c/o: painful contractions  Subjective:     Estimated Date of Delivery: 20  OB History    Para Term  AB Living   2       1     SAB TAB Ectopic Molar Multiple Live Births   1                # Outcome Date GA Lbr [place]/2nd Weight Sex Delivery Anes PTL Lv   2 Current            1 SAB 2019 14w0d             Obstetric Comments   G1 - Missed AB,   EGA 19w2d, but biometry consistent with 14w1d, underwent D&C       Ms. Ronal Guevara is admitted with pregnancy at 39w2d for active labor. Pt reports painful contractions beginning yesterday. Did not sleep last night secondary to painful contractions. Not eating or drinking secondary to pain and nausea with contractions. Prenatal course was complicated by thyroid disease. Neil Sánchez Please see prenatal records for details. Past Medical History:   Diagnosis Date    Hashimoto's thyroiditis      Past Surgical History:   Procedure Laterality Date    HX DILATION AND CURETTAGE  2019     Social History     Occupational History    Not on file   Tobacco Use    Smoking status: Never Smoker    Smokeless tobacco: Never Used   Substance and Sexual Activity    Alcohol use: Not Currently    Drug use: Never    Sexual activity: Yes     Family History   Problem Relation Age of Onset    Thyroid Disease Mother         hyperthyroidism?  Hypertension Father     Thyroid Disease Sister         hypothyroidism?  Thyroid Disease Paternal Grandmother         hyperthyroidism?  Thyroid Disease Paternal Aunt         hyperthyroidism?  Breast Cancer Neg Hx     Ovarian Cancer Neg Hx     Uterine Cancer Neg Hx     Colon Cancer Neg Hx        No Known Allergies  Prior to Admission medications    Medication Sig Start Date End Date Taking?  Authorizing Provider   Prenatal Vit27&Calcium-Iron-FA (VINATE ONE) 60 mg iron-1 mg tab Take 1 Tab by mouth. Yes Provider, Historical        Review of Systems: A comprehensive review of systems was negative except for that written in the HPI. a 12 point review of systems negative except as written in HPI.      Objective:     Vitals:  Vitals:    06/17/20 0905 06/17/20 0906   BP: 119/86 119/86   Pulse: (!) 102 (!) 102   Resp: 18 18   Temp: 98 °F (36.7 °C) 98.2 °F (36.8 °C)   Weight:  81.2 kg (179 lb)   Height:  5' (1.524 m)        Physical Exam:  Patient without distress except for pain with contractions  Heart: Regular rate and rhythm  Lung: clear to auscultation throughout lung fields, no wheezes, no rales, no rhonchi and normal respiratory effort  Back: costovertebral angle tenderness absent  Abdomen: soft, nontender  Fundus: soft and non tender  Perineum: blood absent, amniotic fluid absent  Cervical Exam: 4 cm dilated  (watched in triage with cervical change from 3 to 4.5  in one hour)  100% effaced    -2 station    Presenting Part: cephalic  Lower Extremities:  - Edema 2+   - Patellar Reflexes: 3+ bilaterally  Psych: appropriate; appears tired  Skin- no rashes or lesions  Membranes:  Intact  Fetal Heart Rate: Reactive  Baseline: 130 per minute  Variability: moderate  Accelerations: yes  Decelerations: none  Uterine contractions: regular, every 4-5 minutes    Prenatal Labs:   No results found for: ABORH, RUBELLAEXT, GRBSEXT, HBSAGEXT, HIVEXT, RPREXT, GONNOEXT, CHLAMEXT, ABORHEXT, RUBELLAEXT, GRBSEXT, HBSAGEXT, HIVEXT, RPREXT, GONNOEXT, CHLAMEXT  Recent Results (from the past 12 hour(s))   CBC W/O DIFF    Collection Time: 06/17/20  9:56 AM   Result Value Ref Range    WBC 8.1 4.3 - 11.1 K/uL    RBC 4.34 4.05 - 5.2 M/uL    HGB 12.0 11.7 - 15.4 g/dL    HCT 36.7 35.8 - 46.3 %    MCV 84.6 79.6 - 97.8 FL    MCH 27.6 26.1 - 32.9 PG    MCHC 32.7 31.4 - 35.0 g/dL    RDW 13.7 11.9 - 14.6 %    PLATELET 811 (L) 137 - 450 K/uL    MPV 13.1 (H) 9.4 - 12.3 FL    ABSOLUTE NRBC 0.00 0.0 - 0.2 K/uL   METABOLIC PANEL, COMPREHENSIVE    Collection Time: 06/17/20  9:56 AM   Result Value Ref Range    Sodium 138 136 - 145 mmol/L    Potassium 3.6 3.5 - 5.1 mmol/L    Chloride 109 (H) 98 - 107 mmol/L    CO2 23 21 - 32 mmol/L    Anion gap 6 (L) 7 - 16 mmol/L    Glucose 93 65 - 100 mg/dL    BUN 3 (L) 6 - 23 MG/DL    Creatinine 0.65 0.6 - 1.0 MG/DL    GFR est AA >60 >60 ml/min/1.73m2    GFR est non-AA >60 >60 ml/min/1.73m2    Calcium 8.5 8.3 - 10.4 MG/DL    Bilirubin, total 0.4 0.2 - 1.1 MG/DL    ALT (SGPT) 13 12 - 65 U/L    AST (SGOT) 13 (L) 15 - 37 U/L    Alk. phosphatase 136 50 - 136 U/L    Protein, total 6.0 (L) 6.3 - 8.2 g/dL    Albumin 2.4 (L) 3.5 - 5.0 g/dL    Globulin 3.6 (H) 2.3 - 3.5 g/dL    A-G Ratio 0.7 (L) 1.2 - 3.5     POC URINE DIPSTICK MANUAL    Collection Time: 06/17/20 10:10 AM   Result Value Ref Range    Protein (POC) Negative Negative    Glucose, urine (POC) Negative Negative    Ketones (POC) 40 mg/dL Negative       Assessment/Plan:     Active Problems:    Abdominal pain during pregnancy in third trimester (6/16/2020)      Normal labor (6/17/2020)     antepartum dehydration    Plan: 24 yo G2 at 39w2d with active labor. Also with dehydration. Will iv hydrate. Treat pain with stadol/ phenergan. Admit for Reassuring fetal status, Labor  Progressing normally, Continue plan for vaginal delivery. Group B Strep was positive, will treat prophylactically with penicillin.   Desires epidural.

## 2020-06-17 NOTE — ANESTHESIA PREPROCEDURE EVALUATION
Relevant Problems   No relevant active problems       Anesthetic History   No history of anesthetic complications            Review of Systems / Medical History  Pertinent labs reviewed    Pulmonary  Within defined limits                 Neuro/Psych   Within defined limits           Cardiovascular  Within defined limits                Exercise tolerance: >4 METS     GI/Hepatic/Renal  Within defined limits              Endo/Other      Hypothyroidism  Obesity     Other Findings              Physical Exam    Airway  Mallampati: II  TM Distance: 4 - 6 cm  Neck ROM: normal range of motion   Mouth opening: Normal     Cardiovascular  Regular rate and rhythm,  S1 and S2 normal,  no murmur, click, rub, or gallop             Dental  No notable dental hx       Pulmonary  Breath sounds clear to auscultation               Abdominal  GI exam deferred       Other Findings            Anesthetic Plan    ASA: 2  Anesthesia type: epidural            Anesthetic plan and risks discussed with: Patient and Spouse

## 2020-06-17 NOTE — ANESTHESIA PROCEDURE NOTES
Epidural Block    Performed by: Kira Carter MD  Authorized by: Kira Carter MD     Pre-Procedure  Indication: procedure for pain and labor epidural    Preanesthetic Checklist: patient identified, risks and benefits discussed, anesthesia consent, patient being monitored, timeout performed and anesthesia consent    Timeout Time: 13:37  Preanesthetic Checklist comment:  Risk of nerve damage discussed.     Epidural:   Patient position:  Seated  Prep region:  Lumbar  Prep: Chlorhexidine    Location:  L3-4    Needle and Epidural Catheter:   Needle Type:  Tuohy  Needle Gauge:  17 G  Injection Technique:  Loss of resistance using air  Attempts:  1  Catheter Size:  19 G  Catheter at Skin Depth (cm):  11  Depth in Epidural Space (cm):  5  Events: no blood with aspiration, no cerebrospinal fluid with aspiration, no paresthesia and negative aspiration test    Test Dose:  Lidocaine 1.5% w/ epi and negative    Assessment:   Catheter Secured:  Tegaderm and tape  Insertion:  Uncomplicated  Patient tolerance:  Patient tolerated the procedure well with no immediate complications

## 2020-06-17 NOTE — PROGRESS NOTES
Labor Progress Note    Patient seen, fetal heart rate and contraction pattern evaluated, patient examined. Very comfortable with epidural  Patient Vitals for the past 1 hrs:   BP Temp Pulse Resp   06/17/20 1512 109/55 97.6 °F (36.4 °C) 70 16       Physical Exam:  Cervical Exam:  5 cm dilated    90% effaced    -2 station    Presenting Part: cephalic  Membranes:  Artificial Rupture of Membranes;  Amniotic Fluid: medium amount of thin meconium fluid  Uterine Activity: Frequency: Every 4 minutes  Fetal Heart Rate: Baseline: 120's per minute  Variability: moderate  On pitocin at 12 units min  Burkett placed      Assessment/Plan:  Labor  Progressing normally  Continue expectant management   Late entry arom at 14:30

## 2020-06-18 LAB
BASOPHILS # BLD: 0 K/UL (ref 0–0.2)
BASOPHILS NFR BLD: 0 % (ref 0–2)
BLOOD BANK CMNT PATIENT-IMP: NORMAL
DIFFERENTIAL METHOD BLD: ABNORMAL
EOSINOPHIL # BLD: 0.1 K/UL (ref 0–0.8)
EOSINOPHIL NFR BLD: 1 % (ref 0.5–7.8)
ERYTHROCYTE [DISTWIDTH] IN BLOOD BY AUTOMATED COUNT: 13.9 % (ref 11.9–14.6)
FETAL SCREEN,FMHS: NORMAL
HCT VFR BLD AUTO: 30.3 % (ref 35.8–46.3)
HGB BLD-MCNC: 9.7 G/DL (ref 11.7–15.4)
IMM GRANULOCYTES # BLD AUTO: 0 K/UL (ref 0–0.5)
IMM GRANULOCYTES NFR BLD AUTO: 0 % (ref 0–5)
LYMPHOCYTES # BLD: 1.5 K/UL (ref 0.5–4.6)
LYMPHOCYTES NFR BLD: 14 % (ref 13–44)
MCH RBC QN AUTO: 27.3 PG (ref 26.1–32.9)
MCHC RBC AUTO-ENTMCNC: 32 G/DL (ref 31.4–35)
MCV RBC AUTO: 85.4 FL (ref 79.6–97.8)
MONOCYTES # BLD: 0.7 K/UL (ref 0.1–1.3)
MONOCYTES NFR BLD: 6 % (ref 4–12)
NEUTS SEG # BLD: 8.6 K/UL (ref 1.7–8.2)
NEUTS SEG NFR BLD: 78 % (ref 43–78)
NRBC # BLD: 0 K/UL (ref 0–0.2)
PLATELET # BLD AUTO: 103 K/UL (ref 150–450)
PMV BLD AUTO: 12.8 FL (ref 9.4–12.3)
RBC # BLD AUTO: 3.55 M/UL (ref 4.05–5.2)
WBC # BLD AUTO: 11 K/UL (ref 4.3–11.1)

## 2020-06-18 PROCEDURE — 85461 HEMOGLOBIN FETAL: CPT

## 2020-06-18 PROCEDURE — 36415 COLL VENOUS BLD VENIPUNCTURE: CPT

## 2020-06-18 PROCEDURE — 74011250636 HC RX REV CODE- 250/636: Performed by: OBSTETRICS & GYNECOLOGY

## 2020-06-18 PROCEDURE — 74011250637 HC RX REV CODE- 250/637: Performed by: OBSTETRICS & GYNECOLOGY

## 2020-06-18 PROCEDURE — 85025 COMPLETE CBC W/AUTO DIFF WBC: CPT

## 2020-06-18 PROCEDURE — 65270000029 HC RM PRIVATE

## 2020-06-18 RX ORDER — DOCUSATE SODIUM 100 MG/1
100 CAPSULE, LIQUID FILLED ORAL 2 TIMES DAILY
Status: DISCONTINUED | OUTPATIENT
Start: 2020-06-18 | End: 2020-06-19 | Stop reason: HOSPADM

## 2020-06-18 RX ADMIN — IBUPROFEN 600 MG: 600 TABLET, FILM COATED ORAL at 18:46

## 2020-06-18 RX ADMIN — BENZOCAINE AND LEVOMENTHOL 1 SPRAY: 200; 5 SPRAY TOPICAL at 18:57

## 2020-06-18 RX ADMIN — IBUPROFEN 600 MG: 600 TABLET, FILM COATED ORAL at 07:35

## 2020-06-18 RX ADMIN — RHO(D) IMMUNE GLOBULIN (HUMAN) 0.3 MG: 1500 SOLUTION INTRAMUSCULAR at 18:46

## 2020-06-18 RX ADMIN — WITCH HAZEL 1 PAD: 500 SOLUTION RECTAL; TOPICAL at 07:35

## 2020-06-18 RX ADMIN — DOCUSATE SODIUM 100 MG: 100 CAPSULE, LIQUID FILLED ORAL at 18:46

## 2020-06-18 NOTE — PROGRESS NOTES
Called to room to discuss pump with pt. Pt unsure if flang would drain into container. Reassured pt parts work. Encouraged pt to pump everyty three hours for 15 mins, sitting up right, and to lean forward to remove. Encouraged pt to do breast message before pumping. All questions and concerns answered at this time.

## 2020-06-18 NOTE — PROGRESS NOTES
SBAR OUT Report: Mother    Verbal report given to Eduardo Bonner RN (full name & credentials) on this patient, who is now being transferred to MIU (unit) for routine progression of care. The patient is not wearing a green \"Anesthesia-Duramorph\" band. Report consisted of patient's Situation, Background, Assessment and Recommendations (SBAR).  ID bands were compared with the identification form, and verified with the patient and receiving nurse. Information from the SBAR, Procedure Summary, Intake/Output, MAR and Recent Results and the [facility] Report was reviewed with the receiving nurse; opportunity for questions and clarification provided.

## 2020-06-18 NOTE — LACTATION NOTE
Lactation visit. Met with mom in SCN as she visited infant. Baby stable on HFNC currently. RN started mom pumping last night. Mom last pumped around 6AM,. Got 8ml total. Mom intends to pump and bottle feed only, does not want to latch baby to the breast. Reviewed pumping regimen. Discussed pumping every 3 hours x 15 minutes. Hands on pumping technique reviewed. Discussed colostrum expectations. Be consistent with pumping. Reviewed skin to skin contact benefits and also encouraged pumping at bedside in SCN. Mom states understanding. Offered assistance with pumping today as needed. Discussed proper labeling and collection into syringes for SCN. All questions answered.

## 2020-06-18 NOTE — PROGRESS NOTES
Epidural catheter removed with blue tip intact. Ambulated to the bathroom. Voided easily and copious amount. Preparing for transfer.

## 2020-06-18 NOTE — LACTATION NOTE
Mom called out for help with milk collection. Pumped ~15ml. Reviewed how to collect milk into syringe and properly label for SCN. Mom doing very well with pumping and has current high volumes of colostrum. If pumping more than 10ml can place milk in volufeeder bottle. Questions answered. Mom washing pump parts now.

## 2020-06-18 NOTE — PROGRESS NOTES
Patient is S/P vaginal delivery at 39 2/7 weeks, labor. No complaints today. Lochia < menses. No GI/ issues. No F/C. Visit Vitals  /68 (BP 1 Location: Left arm, BP Patient Position: At rest)   Pulse 84   Temp 98.3 °F (36.8 °C)   Resp 17   Ht 5' (1.524 m)   Wt 179 lb (81.2 kg)   SpO2 99%   Breastfeeding Unknown   BMI 34.96 kg/m²        CV - RRR  LUNGS - CTA bilaterally  ABD - soft, approp tend, FF below umbilicus  EXT - tr edema bilaterally          Labs:    Recent Results (from the past 24 hour(s))   CBC W/O DIFF    Collection Time: 06/17/20  9:56 AM   Result Value Ref Range    WBC 8.1 4.3 - 11.1 K/uL    RBC 4.34 4.05 - 5.2 M/uL    HGB 12.0 11.7 - 15.4 g/dL    HCT 36.7 35.8 - 46.3 %    MCV 84.6 79.6 - 97.8 FL    MCH 27.6 26.1 - 32.9 PG    MCHC 32.7 31.4 - 35.0 g/dL    RDW 13.7 11.9 - 14.6 %    PLATELET 535 (L) 336 - 450 K/uL    MPV 13.1 (H) 9.4 - 12.3 FL    ABSOLUTE NRBC 0.00 0.0 - 0.2 K/uL   METABOLIC PANEL, COMPREHENSIVE    Collection Time: 06/17/20  9:56 AM   Result Value Ref Range    Sodium 138 136 - 145 mmol/L    Potassium 3.6 3.5 - 5.1 mmol/L    Chloride 109 (H) 98 - 107 mmol/L    CO2 23 21 - 32 mmol/L    Anion gap 6 (L) 7 - 16 mmol/L    Glucose 93 65 - 100 mg/dL    BUN 3 (L) 6 - 23 MG/DL    Creatinine 0.65 0.6 - 1.0 MG/DL    GFR est AA >60 >60 ml/min/1.73m2    GFR est non-AA >60 >60 ml/min/1.73m2    Calcium 8.5 8.3 - 10.4 MG/DL    Bilirubin, total 0.4 0.2 - 1.1 MG/DL    ALT (SGPT) 13 12 - 65 U/L    AST (SGOT) 13 (L) 15 - 37 U/L    Alk.  phosphatase 136 50 - 136 U/L    Protein, total 6.0 (L) 6.3 - 8.2 g/dL    Albumin 2.4 (L) 3.5 - 5.0 g/dL    Globulin 3.6 (H) 2.3 - 3.5 g/dL    A-G Ratio 0.7 (L) 1.2 - 3.5     TYPE & SCREEN    Collection Time: 06/17/20  9:56 AM   Result Value Ref Range    Crossmatch Expiration 06/20/2020     ABO/Rh(D) A NEGATIVE     Antibody screen NEG    POC URINE DIPSTICK MANUAL    Collection Time: 06/17/20 10:10 AM   Result Value Ref Range    Protein (POC) Negative Negative Glucose, urine (POC) Negative Negative    Ketones (POC) 40 mg/dL Negative   POC CG8I    Collection Time: 06/17/20  7:43 PM   Result Value Ref Range    Device: ROOM AIR      FIO2 (POC) 21 %    pH (POC) 7.350 7.35 - 7.45      pCO2 (POC) 39.1 35 - 45 MMHG    pO2 (POC) 44 (LL) 75 - 100 MMHG    HCO3 (POC) 21.6 (L) 22 - 26 MMOL/L    sO2 (POC) 78 (L) 95 - 98 %    Base deficit (POC) 4 mmol/L    Allens test (POC) NOT APPLICABLE      Site CORD      Specimen type (POC) ARTERIAL CORD      Sodium,  136 - 145 MMOL/L    Potassium, POC 4.6 3.5 - 5.1 MMOL/L    Glucose, POC 94 65 - 100 MG/DL    Calcium, ionized (POC) 1.62 (H) 1.12 - 1.32 mmol/L    Performed by Radha)RT     COLLECT TIME 1,924     POC CG8I    Collection Time: 06/17/20  7:55 PM   Result Value Ref Range    Device: ROOM AIR      FIO2 (POC) 21 %    pH (POC) 7.351 7.35 - 7.45      pCO2 (POC) 39.4 35 - 45 MMHG    pO2 (POC) 51 (L) 75 - 100 MMHG    HCO3 (POC) 21.8 (L) 22 - 26 MMOL/L    sO2 (POC) 84 (L) 95 - 98 %    Base deficit (POC) 3 mmol/L    Allens test (POC) NOT APPLICABLE      Site CORD      Specimen type (POC) VENOUS CORD      Sodium,  136 - 145 MMOL/L    Potassium, POC 4.7 3.5 - 5.1 MMOL/L    Glucose, POC 92 65 - 100 MG/DL    Calcium, ionized (POC) 1.58 (H) 1.12 - 1.32 mmol/L    Performed by Bayron(Leila)RT     COLLECT TIME 1,924     CBC WITH AUTOMATED DIFF    Collection Time: 06/18/20  6:53 AM   Result Value Ref Range    WBC 11.0 4.3 - 11.1 K/uL    RBC 3.55 (L) 4.05 - 5.2 M/uL    HGB 9.7 (L) 11.7 - 15.4 g/dL    HCT 30.3 (L) 35.8 - 46.3 %    MCV 85.4 79.6 - 97.8 FL    MCH 27.3 26.1 - 32.9 PG    MCHC 32.0 31.4 - 35.0 g/dL    RDW 13.9 11.9 - 14.6 %    PLATELET 316 (L) 337 - 450 K/uL    MPV 12.8 (H) 9.4 - 12.3 FL    ABSOLUTE NRBC 0.00 0.0 - 0.2 K/uL    DF AUTOMATED      NEUTROPHILS 78 43 - 78 %    LYMPHOCYTES 14 13 - 44 %    MONOCYTES 6 4.0 - 12.0 %    EOSINOPHILS 1 0.5 - 7.8 %    BASOPHILS 0 0.0 - 2.0 %    IMMATURE GRANULOCYTES 0 0.0 - 5.0 % ABS. NEUTROPHILS 8.6 (H) 1.7 - 8.2 K/UL    ABS. LYMPHOCYTES 1.5 0.5 - 4.6 K/UL    ABS. MONOCYTES 0.7 0.1 - 1.3 K/UL    ABS. EOSINOPHILS 0.1 0.0 - 0.8 K/UL    ABS. BASOPHILS 0.0 0.0 - 0.2 K/UL    ABS. IMM. GRANS. 0.0 0.0 - 0.5 K/UL         PPD #1      Pt is breast feeding and plans on P4 for birth control. Stable.   Routine PP care      Cheko Bo MD  8:07 AM  20

## 2020-06-18 NOTE — PROGRESS NOTES
Admission assessment complete as noted. Patient oriented to room and unit. Plan of care reviewed and patient verbalizes understanding. Questions encouraged and answered. Patent encouraged to call for needs or concerns. Safety:    1. Call for assistance to void first time  2.   Oriented to room

## 2020-06-18 NOTE — L&D DELIVERY NOTE
Delivery Summary    Patient: Lisa Hoyos MRN: [id number]  SSN: [id number]    YOB: 2000  Age: 23 y.o. Sex: female       Information for the patient's :  Missy Rock [[id number]]       Labor Events:    Labor: No    Steroids: None   Cervical Ripening Date/Time:       Cervical Ripening Type:     Antibiotics During Labor: Yes   Rupture Identifier:      Rupture Date/Time: 2020 2:33 PM   Rupture Type: AROM   Amniotic Fluid Volume: Moderate    Amniotic Fluid Description:      Amniotic Fluid Odor:      Induction:         Induction Date/Time:        Indications for Induction:      Augmentation: Oxytocin   Augmentation Date/Time: 2020    Indications for Augmentation: Ineffective Contraction Pattern   Labor complications: Additional complications:        Delivery Events:  Indications For Episiotomy:     Episiotomy: None   Perineal Laceration(s): 2nd   Repaired: Yes   Periurethral Laceration Location: bilateral    Repaired: No    Labial Laceration Location:     Repaired:     Sulcal Laceration Location:     Repaired:     Vaginal Laceration Location:     Repaired:     Cervical Laceration Location:     Repaired:     Repair Suture: Vicryl 2-0   Number of Repair Packets: 2   Estimated Blood Loss (ml):  ml   Quantitative Blood Loss (ml)                Delivery Date: 2020    Delivery Time: 7:24 PM  Delivery Type: Vaginal, Spontaneous  Sex:  Female    Gestational Age: 39w2d   Delivery Clinician:  Sabrina Monroy  Living Status: Living   Delivery Location: L&D 428          APGARS  One minute Five minutes Ten minutes   Skin color: 0   1        Heart rate: 2   2        Grimace: 2   2        Muscle tone: 1   2        Breathin   2        Totals: 7   9            Presentation: Vertex    Position:        Resuscitation Method:  Suctioning-bulb; Tactile Stimulation;C-PAP     Meconium Stained:  Thin      Cord Information: 3 Vessels  Complications: None  Cord around: shoulders  Delayed cord clamping? Yes  Cord clamped date/time:   Disposition of Cord Blood: Lab    Blood Gases Sent?: Yes    Placenta:  Date/Time: 2020  7:27 PM  Removal: Spontaneous      Appearance: Normal      Measurements:  Birth Weight: 3.05 kg      Birth Length: 51 cm      Head Circumference: 34.5 cm      Chest Circumference: 32 cm     Abdominal Girth: Other Providers:   Shruthi MIRANDA;ALEJO GAMEZ;;;GAUTAM COSTA;SID MAHAJAN;NGOC BRANDON;;;MAAME OLMSTEAD, Obstetrician;Primary Nurse;Primary [place] Nurse;Nicu Nurse;Neonatologist;Anesthesiologist;Crna;Nurse Practitioner;Scrub Tech;Charge Nurse           Group B Strep: No results found for: GRBSEXT, GRBSEXT  Information for the patient's :  Hai Alfonso [[id number]]   No results found for: Vonnie Clements, PCTDIG, BILI, ABORHEXT, ABORH    Recent Labs     20   HCO3I 21.8* 21.6*   SO2I 84* 78*   IBD 3 4   SPECTI VENOUS CORD ARTERIAL CORD   ISITE CORD CORD   IDEV ROOM AIR ROOM AIR   IALLEN NOT APPLICABLE NOT APPLICABLE      Pt arrived in active labor with painful contractions and cervical change. After admission and hydration, contractions stopped. Pt received iv pitocin and pcn for group b strep +. Labor progressed appropriately to completed. Pt pushed approx 40 minutes to deliver vigorous baby girl over intact perineum. Noted to have a loose body cord. Delayed cord clamp per mother request. NICU present with Dr. Suzanne Nichole. Cord gases and bloods obtained. Placenta delivered intact with gentle traction. Second degree perineal laceration- repaired with 2.0 vicryl. . Periurethral lacerations hemostatic- not repaired. Mother and infant doing well.

## 2020-06-18 NOTE — PROGRESS NOTES
SBAR IN Report: Mother    Verbal report received from Kaci Stewart RN (full name & credentials) on this patient, who is now being transferred from L&D (unit) for routine progression of care. The patient is not wearing a green \"Anesthesia-Duramorph\" band. Report consisted of patient's Situation, Background, Assessment and Recommendations (SBAR). [place] ID bands were compared with the identification form, and verified with the patient and transferring nurse. Information from the SBAR, Procedure Summary, Intake/Output, MAR, Recent Results and Quality Measures and the Roxanne Report was reviewed with the transferring nurse; opportunity for questions and clarification provided.

## 2020-06-18 NOTE — ANESTHESIA POSTPROCEDURE EVALUATION
* No procedures listed *.    epidural    Anesthesia Post Evaluation        Patient location during evaluation: floor  Patient participation: complete - patient participated  Level of consciousness: awake  Pain management: satisfactory to patient  Airway patency: patent  Anesthetic complications: no  Cardiovascular status: hemodynamically stable  Respiratory status: spontaneous ventilation  Hydration status: euvolemic  Post anesthesia nausea and vomiting:  none    The patient was satisfied with her labor epidural and denies any complications. Her lower extremities have returned to baseline neurologically. INITIAL Post-op Vital signs: No vitals data found for the desired time range.

## 2020-06-18 NOTE — ROUTINE PROCESS
Patient awake and sitting up on side of bed, states getting ready to eat lunch, denies needs at this time

## 2020-06-19 VITALS
TEMPERATURE: 98.4 F | SYSTOLIC BLOOD PRESSURE: 135 MMHG | BODY MASS INDEX: 35.14 KG/M2 | RESPIRATION RATE: 16 BRPM | DIASTOLIC BLOOD PRESSURE: 68 MMHG | OXYGEN SATURATION: 97 % | HEIGHT: 60 IN | WEIGHT: 179 LBS | HEART RATE: 91 BPM

## 2020-06-19 LAB
ALBUMIN SERPL-MCNC: 2.2 G/DL (ref 3.5–5)
ALBUMIN/GLOB SERPL: 0.7 {RATIO} (ref 1.2–3.5)
ALP SERPL-CCNC: 107 U/L (ref 50–136)
ALT SERPL-CCNC: 17 U/L (ref 12–65)
ANION GAP SERPL CALC-SCNC: 6 MMOL/L (ref 7–16)
AST SERPL-CCNC: 20 U/L (ref 15–37)
BILIRUB SERPL-MCNC: 0.2 MG/DL (ref 0.2–1.1)
BUN SERPL-MCNC: 3 MG/DL (ref 6–23)
CALCIUM SERPL-MCNC: 8.1 MG/DL (ref 8.3–10.4)
CHLORIDE SERPL-SCNC: 111 MMOL/L (ref 98–107)
CO2 SERPL-SCNC: 23 MMOL/L (ref 21–32)
CREAT SERPL-MCNC: 0.67 MG/DL (ref 0.6–1)
ERYTHROCYTE [DISTWIDTH] IN BLOOD BY AUTOMATED COUNT: 14.3 % (ref 11.9–14.6)
GLOBULIN SER CALC-MCNC: 3.3 G/DL (ref 2.3–3.5)
GLUCOSE SERPL-MCNC: 100 MG/DL (ref 65–100)
HCT VFR BLD AUTO: 29.8 % (ref 35.8–46.3)
HGB BLD-MCNC: 9.6 G/DL (ref 11.7–15.4)
MCH RBC QN AUTO: 27.7 PG (ref 26.1–32.9)
MCHC RBC AUTO-ENTMCNC: 32.2 G/DL (ref 31.4–35)
MCV RBC AUTO: 85.9 FL (ref 79.6–97.8)
NRBC # BLD: 0 K/UL (ref 0–0.2)
PLATELET # BLD AUTO: 108 K/UL (ref 150–450)
PMV BLD AUTO: 12.4 FL (ref 9.4–12.3)
POTASSIUM SERPL-SCNC: 3.7 MMOL/L (ref 3.5–5.1)
PROT SERPL-MCNC: 5.5 G/DL (ref 6.3–8.2)
RBC # BLD AUTO: 3.47 M/UL (ref 4.05–5.2)
SODIUM SERPL-SCNC: 140 MMOL/L (ref 136–145)
URATE SERPL-MCNC: 4.2 MG/DL (ref 2.6–6)
WBC # BLD AUTO: 8 K/UL (ref 4.3–11.1)

## 2020-06-19 PROCEDURE — 84550 ASSAY OF BLOOD/URIC ACID: CPT

## 2020-06-19 PROCEDURE — 85027 COMPLETE CBC AUTOMATED: CPT

## 2020-06-19 PROCEDURE — 74011250637 HC RX REV CODE- 250/637: Performed by: OBSTETRICS & GYNECOLOGY

## 2020-06-19 PROCEDURE — 80053 COMPREHEN METABOLIC PANEL: CPT

## 2020-06-19 PROCEDURE — 36415 COLL VENOUS BLD VENIPUNCTURE: CPT

## 2020-06-19 RX ORDER — IBUPROFEN 600 MG/1
600 TABLET ORAL
Qty: 30 TAB | Refills: 0 | Status: SHIPPED | OUTPATIENT
Start: 2020-06-19

## 2020-06-19 RX ADMIN — IBUPROFEN 600 MG: 600 TABLET, FILM COATED ORAL at 10:32

## 2020-06-19 RX ADMIN — IBUPROFEN 600 MG: 600 TABLET, FILM COATED ORAL at 02:17

## 2020-06-19 RX ADMIN — DOCUSATE SODIUM 100 MG: 100 CAPSULE, LIQUID FILLED ORAL at 10:32

## 2020-06-19 NOTE — PROGRESS NOTES
Chart reviewed - first time parent. Baby in NICU (respiratory distress); will likely return to patient's room today.  provided education on [facility] Postpartum [place] Home Visit. At this time, [facility] is completing this  home visit telephonically due to social distancing. Family would like to participate in program.  Referral will be made at discharge. Patient given informational packet on  mood & anxiety disorders (resources/education). Family denies any additional needs from  at this time. Family has 's contact information should any needs/questions arise.     CAMERON Urena-ANGLE  [address] [facility]   [phone]

## 2020-06-19 NOTE — LACTATION NOTE
Noted mom ad baby most likely going home together today. Rental complete. Mom desires to pump and bottle feed. Pumped 20 ml at last pumping. Reviewed supply and demand. Will give all pumped colostrum and finish full feeding with formula as needed. Encouraged frequent feeding and watch output. Reviewed Breastfeeding Packet and storage info. Offered assistance at breast if desired.

## 2020-06-19 NOTE — DISCHARGE INSTRUCTIONS
Discharge instruction to follow: Activity: Pelvis rest for 6 weeks     No heavy lifting over 15 lbs for 2 weeks     No driving for 2 weeks     No push/pull motion such as sweeping or vacuuming for 2 weeks     No tub baths for 6 weeks    If using sitz bath continue until comfortable stopping. If using cass-bottle continue to use until comfortable stopping. Change sanitary pad after each urination or bowel movement. Call MD for the following:      Fever over 101 F; pain not relieved by medication; foul smelling vaginal discharge or an increase in vaginal bleeding. Take medication as prescribed. Follow up with MD as ordered. Patient Education        Vaginal Childbirth: Care Instructions  Your Care Instructions     Your body will slowly heal in the next few weeks. It is easy to get too tired and overwhelmed during the first weeks after your baby is born. Changes in your hormones can shift your mood without warning. You may find it hard to meet the extra demands on your energy and time. Take it easy on yourself. Follow-up care is a key part of your treatment and safety. Be sure to make and go to all appointments, and call your doctor if you are having problems. It's also a good idea to know your test results and keep a list of the medicines you take. How can you care for yourself at home? · Vaginal bleeding and cramps  ? After delivery, you will have a bloody discharge from the vagina. This will turn pink within a week and then white or yellow after about 10 days. It may last for 2 to 4 weeks or longer, until the uterus has healed. Use pads instead of tampons until you stop bleeding. ? Do not worry if you pass some blood clots, as long as they are smaller than a golf ball. If you have a tear or stitches in your vaginal area, change the pad at least every 4 hours to prevent soreness and infection. ? You may have cramps for the first few days after childbirth.  These are normal and occur as the uterus shrinks to normal size. Take an over-the-counter pain medicine, such as acetaminophen (Tylenol), ibuprofen (Advil, Motrin), or naproxen (Aleve), for cramps. Read and follow all instructions on the label. Do not take aspirin, because it can cause more bleeding. ? Do not take two or more pain medicines at the same time unless the doctor told you to. Many pain medicines have acetaminophen, which is Tylenol. Too much acetaminophen (Tylenol) can be harmful. · Stitches  ? If you have stitches, they will dissolve on their own and do not need to be removed. Follow your doctor's instructions for cleaning the stitched area. ? Put ice or a cold pack on your painful area for 10 to 20 minutes at a time, several times a day, for the first few days. Put a thin cloth between the ice and your skin. ? Sit in a few inches of warm water (sitz bath) 3 times a day and after bowel movements. The warm water helps with pain and itching. If you do not have a tub, a warm shower might help. · Breast fullness  ? Your breasts may overfill (engorge) in the first few days after delivery. To help milk flow and to relieve pain, warm your breasts in the shower or by using warm, moist towels before nursing. ? If you are not nursing, do not put warmth on your breasts or touch your breasts. Wear a tight bra or sports bra and use ice until the fullness goes away. This usually takes 2 to 3 days. ? Put ice or a cold pack on your breast after nursing to reduce swelling and pain. Put a thin cloth between the ice and your skin. · Activity  ? Eat a balanced diet. Do not try to lose weight by cutting calories. Keep taking your prenatal vitamins, or take a multivitamin. ? Get as much rest as you can. Try to take naps when your baby sleeps during the day. ? Get some exercise every day. But do not do any heavy exercise until your doctor says it is okay. ? Wait until you are healed (about 4 to 6 weeks) before you have sexual intercourse.  Your doctor will tell you when it is okay to have sex. ? Talk to your doctor about birth control. You can get pregnant even before your period returns. Also, you can get pregnant while you are breastfeeding. · Mental health  ? It is normal to have some sadness, anxiety, sleeplessness, and mood swings after you go home. If you feel upset or hopeless for more than a few days or are having trouble doing the things you need to do, talk to your doctor. · Constipation and hemorrhoids  ? Drink plenty of fluids, enough so that your urine is light yellow or clear like water. If you have kidney, heart, or liver disease and have to limit fluids, talk with your doctor before you increase the amount of fluids you drink. ? Eat plenty of fiber each day. Have a bran muffin or bran cereal for breakfast, and try eating a piece of fruit for a mid-afternoon snack. ? For painful, itchy hemorrhoids, put ice or a cold pack on the area several times a day for 10 minutes at a time. Follow this by putting a warm compress on the area for another 10 to 20 minutes or by sitting in a shallow, warm bath. When should you call for help? Call  911 anytime you think you may need emergency care. For example, call if:  · You have thoughts of harming yourself, your baby, or another person. · You passed out (lost consciousness). · You have chest pain, are short of breath, or cough up blood. · You have a seizure. Call your doctor now or seek immediate medical care if:  · You have severe vaginal bleeding. · You are dizzy or lightheaded, or you feel like you may faint. · You have a fever. · You have new or more pain in your belly or pelvis. · You have symptoms of a blood clot in your leg (called a deep vein thrombosis), such as:  ? Pain in the calf, back of the knee, thigh, or groin. ? Redness and swelling in your leg or groin. · You have signs of preeclampsia, such as:  ? Sudden swelling of your face, hands, or feet.   ? New vision problems (such as dimness, blurring, or seeing spots). ? A severe headache. Watch closely for changes in your health, and be sure to contact your doctor if:  · Your vaginal bleeding seems to be getting heavier. · You have new or worse vaginal discharge. · You feel sad, anxious, or hopeless for more than a few days. · You do not get better as expected. Where can you learn more? Go to http://erinn-ricki.info/  Enter Q237 in the search box to learn more about \"Vaginal Childbirth: Care Instructions. \"  Current as of: February 11, 2020               Content Version: 12.5  © 7167-6785 Healthwise, [employer]. Care instructions adapted under license by [employer] (which disclaims liability or warranty for this information). If you have questions about a medical condition or this instruction, always ask your healthcare professional. [address] any warranty or liability for your use of this information.

## 2020-06-19 NOTE — DISCHARGE SUMMARY
Date of Admission:  2020  8:26 AM  Date of Discharge:   2020    Patient Active Problem List   Diagnosis Code    Hashimoto's thyroiditis E06.3    Thyroid disease affecting pregnancy O99.280, E07.9    Multigravida in third trimester Z34.83    Rh negative state in antepartum period O26.899, Z67.91    Rubella non-immune status, antepartum O99.89, Z28.3    GBS (group B Streptococcus carrier), +RV culture, currently pregnant O99.820    Abdominal pain during pregnancy in third trimester O26.893, R10.9    Normal labor O80, Z37.9       [address] 23 y.o.  presented at 39w2d in active labor. Pt had  without incident. See delivery note for all delivery details. Pt's PP course was significant for drop in platelets from 747 to 100. ..then remained stable with normal bps/labs. On day of D/C, she was ambulating well, afebrile, with lochia < menses. She was discharged home with medications as below. Pt was breast feeding on discharge. Routine PP instructions given to patient. She is to follow up with [facility] in 2 weeks for PP exam.    Current Discharge Medication List      START taking these medications    Details   ibuprofen (MOTRIN) 600 mg tablet Take 1 Tab by mouth every six (6) hours as needed for Pain. Indications: pain  Qty: 30 Tab, Refills: 0         CONTINUE these medications which have NOT CHANGED    Details   Prenatal Vit27&Calcium-Iron-FA (VINATE ONE) 60 mg iron-1 mg tab Take 1 Tab by mouth.              Janine Mullen DO  3:04 PM  20

## 2020-06-19 NOTE — PROGRESS NOTES
Teaching for self care reviewed. Discharge instructions reviewed and E-signed. Copy given to pt. Prescription reviewed. Reviewed follow up appointment for self. Sitz bath provided with instructions for use. Questions encouraged and answered. Pt to complete infant's discharge teaching in [facility]. Instructed to call when ready for discharge.

## 2020-06-19 NOTE — PROGRESS NOTES
Shift assessment complete as noted. Patient denies needs. Questions encouraged and answered. Encouraged to call for needs or concerns. Verbalizes understanding. MMR and TDAP VIS sheets given.

## 2020-06-19 NOTE — PROGRESS NOTES
Dr Kirsten Pérez at [facility]. MD states to hold TDAP and MMR vaccines due to low platelets. Read back.

## 2022-03-18 PROBLEM — Z28.39 RUBELLA NON-IMMUNE STATUS, ANTEPARTUM: Status: ACTIVE | Noted: 2019-11-22

## 2022-03-18 PROBLEM — O09.899 RUBELLA NON-IMMUNE STATUS, ANTEPARTUM: Status: ACTIVE | Noted: 2019-11-22

## 2022-03-18 PROBLEM — E07.9 THYROID DISEASE AFFECTING PREGNANCY: Status: ACTIVE | Noted: 2019-11-20

## 2022-03-18 PROBLEM — Z37.9 NORMAL LABOR: Status: ACTIVE | Noted: 2020-06-17

## 2022-03-18 PROBLEM — O99.280 THYROID DISEASE AFFECTING PREGNANCY: Status: ACTIVE | Noted: 2019-11-20

## 2022-03-18 PROBLEM — O99.820 GBS (GROUP B STREPTOCOCCUS CARRIER), +RV CULTURE, CURRENTLY PREGNANT: Status: ACTIVE | Noted: 2020-06-01

## 2022-03-19 PROBLEM — Z67.91 RH NEGATIVE STATE IN ANTEPARTUM PERIOD: Status: ACTIVE | Noted: 2019-11-22

## 2022-03-19 PROBLEM — O26.893 ABDOMINAL PAIN DURING PREGNANCY IN THIRD TRIMESTER: Status: ACTIVE | Noted: 2020-06-16

## 2022-03-19 PROBLEM — R10.9 ABDOMINAL PAIN DURING PREGNANCY IN THIRD TRIMESTER: Status: ACTIVE | Noted: 2020-06-16

## 2022-03-19 PROBLEM — Z34.83 MULTIGRAVIDA IN THIRD TRIMESTER: Status: ACTIVE | Noted: 2019-11-20

## 2022-03-19 PROBLEM — O26.899 RH NEGATIVE STATE IN ANTEPARTUM PERIOD: Status: ACTIVE | Noted: 2019-11-22

## 2022-10-10 ENCOUNTER — ROUTINE PRENATAL (OUTPATIENT)
Dept: OBGYN CLINIC | Age: 22
End: 2022-10-10
Payer: COMMERCIAL

## 2022-10-10 VITALS
BODY MASS INDEX: 31.1 KG/M2 | DIASTOLIC BLOOD PRESSURE: 80 MMHG | HEIGHT: 62 IN | SYSTOLIC BLOOD PRESSURE: 118 MMHG | WEIGHT: 169 LBS

## 2022-10-10 DIAGNOSIS — Z34.81 MULTIGRAVIDA IN FIRST TRIMESTER: ICD-10-CM

## 2022-10-10 DIAGNOSIS — Z67.91 RH NEGATIVE STATE IN ANTEPARTUM PERIOD: ICD-10-CM

## 2022-10-10 DIAGNOSIS — O36.80X0 ENCOUNTER TO DETERMINE FETAL VIABILITY OF PREGNANCY, SINGLE OR UNSPECIFIED FETUS: Primary | ICD-10-CM

## 2022-10-10 DIAGNOSIS — O26.899 RH NEGATIVE STATE IN ANTEPARTUM PERIOD: ICD-10-CM

## 2022-10-10 DIAGNOSIS — E07.9 THYROID DISEASE AFFECTING PREGNANCY: ICD-10-CM

## 2022-10-10 DIAGNOSIS — O99.280 THYROID DISEASE AFFECTING PREGNANCY: ICD-10-CM

## 2022-10-10 PROBLEM — R10.9 ABDOMINAL PAIN DURING PREGNANCY IN THIRD TRIMESTER: Status: RESOLVED | Noted: 2020-06-16 | Resolved: 2022-10-10

## 2022-10-10 PROBLEM — Z28.39 RUBELLA NON-IMMUNE STATUS, ANTEPARTUM: Status: RESOLVED | Noted: 2019-11-22 | Resolved: 2022-10-10

## 2022-10-10 PROBLEM — Z34.83 MULTIGRAVIDA IN THIRD TRIMESTER: Status: RESOLVED | Noted: 2019-11-20 | Resolved: 2022-10-10

## 2022-10-10 PROBLEM — Z37.9 NORMAL LABOR: Status: RESOLVED | Noted: 2020-06-17 | Resolved: 2022-10-10

## 2022-10-10 PROBLEM — O09.899 RUBELLA NON-IMMUNE STATUS, ANTEPARTUM: Status: RESOLVED | Noted: 2019-11-22 | Resolved: 2022-10-10

## 2022-10-10 PROBLEM — O26.893 ABDOMINAL PAIN DURING PREGNANCY IN THIRD TRIMESTER: Status: RESOLVED | Noted: 2020-06-16 | Resolved: 2022-10-10

## 2022-10-10 PROBLEM — O99.820 GBS (GROUP B STREPTOCOCCUS CARRIER), +RV CULTURE, CURRENTLY PREGNANT: Status: RESOLVED | Noted: 2020-06-01 | Resolved: 2022-10-10

## 2022-10-10 LAB
ABO + RH BLD: NORMAL
ERYTHROCYTE [DISTWIDTH] IN BLOOD BY AUTOMATED COUNT: 13.6 % (ref 11.9–14.6)
EST. AVERAGE GLUCOSE BLD GHB EST-MCNC: 111 MG/DL
HBA1C MFR BLD: 5.5 % (ref 4.8–5.6)
HCT VFR BLD AUTO: 40.2 % (ref 35.8–46.3)
HGB BLD-MCNC: 12.9 G/DL (ref 11.7–15.4)
MCH RBC QN AUTO: 26.9 PG (ref 26.1–32.9)
MCHC RBC AUTO-ENTMCNC: 32.1 G/DL (ref 31.4–35)
MCV RBC AUTO: 83.8 FL (ref 79.6–97.8)
NRBC # BLD: 0 K/UL (ref 0–0.2)
PLATELET # BLD AUTO: 251 K/UL (ref 150–450)
PMV BLD AUTO: 11.2 FL (ref 9.4–12.3)
RBC # BLD AUTO: 4.8 M/UL (ref 4.05–5.2)
WBC # BLD AUTO: 10.1 K/UL (ref 4.3–11.1)

## 2022-10-10 PROCEDURE — 76801 OB US < 14 WKS SINGLE FETUS: CPT | Performed by: NURSE PRACTITIONER

## 2022-10-10 PROCEDURE — 99213 OFFICE O/P EST LOW 20 MIN: CPT | Performed by: NURSE PRACTITIONER

## 2022-10-10 RX ORDER — PNV NO.95/FERROUS FUM/FOLIC AC 28MG-0.8MG
1 TABLET ORAL DAILY
Qty: 90 TABLET | Refills: 3 | Status: SHIPPED | OUTPATIENT
Start: 2022-10-10

## 2022-10-10 NOTE — PROGRESS NOTES
I have reviewed the patient's visit today including history, exam and assessment by FATOUMATA Kaur. I agree with treatment/plan as above.     Clementine Umanzor MD  3:45 PM  10/10/22

## 2022-10-10 NOTE — ASSESSMENT & PLAN NOTE
History reviewed  PNV's ordered (if not already taking)  PN labs, UDS ordered  Problem list reviewed  OB physical completed  OB prenatal packet/education reviewed: Information included discusses general pregnancy topics, fetal development, weight gain, nutrition, breastfeeding, risky behaviors, WIC, vaccinations and hospital information. RTO 4 weeks  PTL/labor precautions, [address], and pregnancy warning signs reviewed. Genetic testing - D/W pt at length genetic testing that is recommended by ACOG -- NIPT, Quad screen (for trisomies and NTD), CF, SMA. Brief discussion of these diseases - conditions that may increase risks, etiology, carrier states, fetal effects, treatment options, etc was undertaken. D/W pt that these are screening tests/carrier screening test only and are NOT mandatory. We also discuss false POS/false neg rates. It is her decision whether to have them done and how to proceed with the information afterwards.

## 2022-10-10 NOTE — PROGRESS NOTES
HPI    This is a 23 y.o.  [id number] at [address] [place] [address] for new OB visit. Her Estimated Due Date is 2023, by Last Menstrual Period            Denies leaking of fluid, vaginal bleeding, or regular contractions. Current Outpatient Medications on File Prior to Visit   Medication Sig Dispense Refill    ibuprofen (ADVIL;MOTRIN) 600 MG tablet Take 600 mg by mouth every 6 hours as needed (Patient not taking: Reported on 10/10/2022)       No current facility-administered medications on file prior to visit. No Known Allergies        OB History    Para Term  AB Living   4 1 1 0 2 1   SAB IAB Ectopic Molar Multiple Live Births   2 0 0 0 0 1       # 1 - Date: None, Sex: None, Weight: None, GA: None, Delivery: None, Apgar1: None, Apgar5: None, Living: None, Birth Comments: None    # 2 - Date: 2019, Sex: None, Weight: None, GA: 14w0d, Delivery: None, Apgar1: None, Apgar5: None, Living: None, Birth Comments: None    # 3 - Date: 20, Sex: Female, Weight: 6 lb 11.6 oz (3.05 kg), GA: 39w2d, Delivery: Vaginal, Spontaneous, Apgar1: 7, Apgar5: 9, Living: Living, Birth Comments: None    # 4 - Date: None, Sex: None, Weight: None, GA: None, Delivery: None, Apgar1: None, Apgar5: None, Living: None, Birth Comments: None          Past Medical History:   Diagnosis Date    Hashimoto's thyroiditis        Past Surgical History:   Procedure Laterality Date    DILATION AND CURETTAGE OF UTERUS  2019       Family History   Problem Relation Age of Onset    Uterine Cancer Neg Hx     Thyroid Disease Paternal Grandmother         hyperthyroidism?  Thyroid Disease Sister         hypothyroidism?  Thyroid Disease Paternal Aunt         hyperthyroidism?  Thyroid Disease Mother         hyperthyroidism?     Colon Cancer Neg Hx     Ovarian Cancer Neg Hx     Breast Cancer Neg Hx     Hypertension Father        Social History     Socioeconomic History    Marital status: Unknown     Spouse name: Not on file    Number of children: Not on file    Years of education: Not on file    Highest education level: Not on file   Occupational History    Not on file   Tobacco Use    Smoking status: Never    Smokeless tobacco: Never   Substance and Sexual Activity    Alcohol use: Not Currently    Drug use: Never    Sexual activity: Not Currently   Other Topics Concern    Not on file   Social History Narrative    Abuse: Feels safe at home, no history of physical abuse, no history of sexual abuse       Social Determinants of Health     Financial Resource Strain: Not on file   Food Insecurity: Not on file   Transportation Needs: Not on file   Physical Activity: Not on file   Stress: Not on file   Social Connections: Not on file   Intimate Partner Violence: Not on file   Housing Stability: Not on file           Objective        Vitals:    10/10/22 1444   BP: 118/80   Site: Left Upper Arm   Position: Sitting   Weight: 169 lb (76.7 kg)   Height: 5' 2\" (1.575 m)           General: well developed, well nourished, in no acute distress    Head: normocephalic and atraumatic    Resp: even and unlabored    Psych: Normal mood and affect        Assessment and Plan    More than 50% of this 30 minute visit was spent counseling the patient on pregnancy expectations. Patient Active Problem List    Diagnosis Date Noted    Multigravida in first trimester 10/10/2022     Priority: Medium     Overview Note:     OLAYINKA 5/16/2023 by LMP c/w 8 wk US    1/14/2020: CF/SMA negative       Assessment & Plan Note:     History reviewed  PNV's ordered (if not already taking)  PN labs, UDS ordered  Problem list reviewed  OB physical completed  OB prenatal packet/education reviewed: Information included discusses general pregnancy topics, fetal development, weight gain, nutrition, breastfeeding, risky behaviors, WIC, vaccinations and hospital information. RTO 4 weeks  PTL/labor precautions, [address], and pregnancy warning signs reviewed.       Genetic testing - D/W pt at length genetic testing that is recommended by ACOG -- NIPT, Quad screen (for trisomies and NTD), CF, SMA. Brief discussion of these diseases - conditions that may increase risks, etiology, carrier states, fetal effects, treatment options, etc was undertaken. D/W pt that these are screening tests/carrier screening test only and are NOT mandatory. We also discuss false POS/false neg rates. It is her decision whether to have them done and how to proceed with the information afterwards.  Rh negative state in antepartum period 11/22/2019     Overview Note:     Rhogam at 28 weeks        Thyroid disease affecting pregnancy 11/20/2019     Overview Note:     11/20/2019: Seen by Dr Parth Mc in 2018 for Hashimoto's Thyroiditis not   requiring treatment. 10/10/2022: no treatment, will check labs today          Hashimoto's thyroiditis         Problem List Items Addressed This Visit        Endocrine    Thyroid disease affecting pregnancy       Other    Multigravida in first trimester     History reviewed  PNV's ordered (if not already taking)  PN labs, UDS ordered  Problem list reviewed  OB physical completed  OB prenatal packet/education reviewed: Information included discusses general pregnancy topics, fetal development, weight gain, nutrition, breastfeeding, risky behaviors, WIC, vaccinations and hospital information. RTO 4 weeks  PTL/labor precautions, [facility], and pregnancy warning signs reviewed. Genetic testing - D/W pt at length genetic testing that is recommended by ACOG -- NIPT, Quad screen (for trisomies and NTD), CF, SMA. Brief discussion of these diseases - conditions that may increase risks, etiology, carrier states, fetal effects, treatment options, etc was undertaken. D/W pt that these are screening tests/carrier screening test only and are NOT mandatory. We also discuss false POS/false neg rates.  It is her decision whether to have them done and how to proceed with the information afterwards. Relevant Orders    ABO/Rh    Antibody Screen    CBC    Hemoglobinopathy Evaluation    Hepatitis B Surface Antigen    Hepatitis C Antibody    RPR    HIV 1/2 Ag/Ab, 4TH Generation,W Rflx Confirm    Rubella antibody, IgG    Hemoglobin A1C    Urine Drug Screen    TSH    T4, Free    PAP LB, Reflex HPV ASCUS ([id number])    Nuswab Vaginitis Plus (VG+)    Rh negative state in antepartum period   Other Visit Diagnoses     Encounter to determine fetal viability of pregnancy, single or unspecified fetus    -  Primary    Relevant Orders    AMB POC US OB < 14 WKS, 1ST GESTATION (Completed)          Orders Placed This Encounter   Procedures    Nuswab Vaginitis Plus (VG+)    AMB POC US OB < 14 WKS, 1ST GESTATION    CBC    Hemoglobinopathy Evaluation    Hepatitis B Surface Antigen    Hepatitis C Antibody    RPR    HIV 1/2 Ag/Ab, 4TH Generation,W Rflx Confirm    Rubella antibody, IgG    Hemoglobin A1C    Urine Drug Screen    TSH    T4, Free    PAP LB, Reflex HPV ASCUS ([id number])    ABO/Rh    Antibody Screen       Outpatient Encounter Medications as of 10/10/2022   Medication Sig Dispense Refill    Prenatal Vit-Fe Fumarate-FA (PRENATAL VITAMIN) 27-0.8 MG TABS Take 1 tablet by mouth daily 90 tablet 3    ibuprofen (ADVIL;MOTRIN) 600 MG tablet Take 600 mg by mouth every 6 hours as needed (Patient not taking: Reported on 10/10/2022)       No facility-administered encounter medications on file as of 10/10/2022.                Labor signs, pregnancy warning signs, and fetal movement counting reviewed (if applicable)            Nigel Jean, NP, APRN - CNP 10/10/22 3:17 PM

## 2022-10-10 NOTE — PROGRESS NOTES
Patient comes in today for initial prenatal visit. No complaints/concerns today. Pt wants a cervical exam.     Fetal Movements:  No  Contractions:  No  Vaginal Bleeding:  No  Leaking Fluid:  No  GI/ issues:  No    Drug/Alcohol 4P's Plus Screening    1. Have either of your parents ever had a problem with drugs/alcohol/prescription drugs? No  2. Does your partner have a problem with drugs/alcohol/prescription drugs? No  3. In the past, have you ever had a problem with drugs/alcohol/prescription drugs? No  4. Before you were pregnant, in the past month, have you done any drugs, drank any alcohol or abused any prescription drugs? No  If \"YES\" to any of the above, please give further details:      LAST PAP:  Unknown     LAST MAMMO:  Never    LMP:  Patient's last menstrual period was 08/09/2022 (exact date).     FAMILY HISTORY OF:   Breast Cancer:  No   Ovarian Cancer:  No   Uterine Cancer:  No   Colon Cancer:  No    Vitals:    10/10/22 1444   BP: 118/80   Site: Left Upper Arm   Position: Sitting   Weight: 169 lb (76.7 kg)   Height: 5' 2\" (1.575 m)        Zuleyma Duval MA  10/10/22  2:55 PM

## 2022-10-11 LAB
BLOOD GROUP ANTIBODIES SERPL: NORMAL
HBV SURFACE AG SER QL: NONREACTIVE
HCV AB SER QL: NONREACTIVE
HIV 1+2 AB+HIV1 P24 AG SERPL QL IA: NONREACTIVE
HIV 1/2 RESULT COMMENT: NORMAL
RPR SER QL: NONREACTIVE
RUBV IGG SERPL IA-ACNC: 0.7 IU/ML (ref 0–50)
T4 FREE SERPL-MCNC: 1.3 NG/DL (ref 0.78–1.46)
TSH, 3RD GENERATION: 0.39 UIU/ML (ref 0.36–3.74)

## 2022-10-13 LAB
A VAGINAE DNA VAG QL NAA+PROBE: ABNORMAL SCORE
BVAB2 DNA VAG QL NAA+PROBE: ABNORMAL SCORE
C ALBICANS DNA VAG QL NAA+PROBE: NEGATIVE
C GLABRATA DNA VAG QL NAA+PROBE: NEGATIVE
C TRACH RRNA SPEC QL NAA+PROBE: NEGATIVE
HGB A MFR BLD: 97.4 % (ref 96.4–98.8)
HGB A2 MFR BLD COLUMN CHROM: 2.6 % (ref 1.8–3.2)
HGB F MFR BLD: 0 % (ref 0–2)
HGB FRACT BLD-IMP: NORMAL
HGB S MFR BLD: 0 %
MEGA1 DNA VAG QL NAA+PROBE: ABNORMAL SCORE
N GONORRHOEA RRNA SPEC QL NAA+PROBE: NEGATIVE
SPECIMEN SOURCE: ABNORMAL
T VAGINALIS RRNA SPEC QL NAA+PROBE: NEGATIVE

## 2022-10-13 RX ORDER — METRONIDAZOLE 500 MG/1
500 TABLET ORAL 2 TIMES DAILY
Qty: 14 TABLET | Refills: 0 | Status: SHIPPED | OUTPATIENT
Start: 2022-10-13 | End: 2022-10-20

## 2022-10-13 NOTE — TELEPHONE ENCOUNTER
Patient tested positive for Bacterial Vaginosis.  If having symptoms may have one of the following for treatment if not allergic    Flagyl 500mg PO BID for 7 days, #14, No Refills   OR    Metrogel apply 1 applicator nightly for 5 nights, #5, No Refills

## 2022-10-18 LAB
CYTOLOGIST CVX/VAG CYTO: NORMAL
CYTOLOGY CVX/VAG DOC THIN PREP: NORMAL
HPV REFLEX: NORMAL
Lab: NORMAL
PATH REPORT.FINAL DX SPEC: NORMAL
STAT OF ADQ CVX/VAG CYTO-IMP: NORMAL

## 2022-10-26 ENCOUNTER — TELEPHONE (OUTPATIENT)
Dept: OBGYN CLINIC | Age: 22
End: 2022-10-26

## 2022-10-26 NOTE — TELEPHONE ENCOUNTER
Metronidazole may have caused a yeast infection. She can use 7 day Monistat OTC. If no improvement by next visit on 11/8/2022 we can reswab.  Thanks

## 2022-10-26 NOTE — TELEPHONE ENCOUNTER
Patient sent message stating she finished the medication for BV but her symptoms have reappeared. Please advise.

## 2022-11-08 ENCOUNTER — ROUTINE PRENATAL (OUTPATIENT)
Dept: OBGYN CLINIC | Age: 22
End: 2022-11-08
Payer: MEDICAID

## 2022-11-08 VITALS
HEIGHT: 62 IN | SYSTOLIC BLOOD PRESSURE: 116 MMHG | BODY MASS INDEX: 32.57 KG/M2 | DIASTOLIC BLOOD PRESSURE: 70 MMHG | WEIGHT: 177 LBS

## 2022-11-08 DIAGNOSIS — Z67.91 RH NEGATIVE STATE IN ANTEPARTUM PERIOD: ICD-10-CM

## 2022-11-08 DIAGNOSIS — O99.280 THYROID DISEASE AFFECTING PREGNANCY: ICD-10-CM

## 2022-11-08 DIAGNOSIS — Z34.82 MULTIGRAVIDA IN SECOND TRIMESTER: ICD-10-CM

## 2022-11-08 DIAGNOSIS — O26.899 RH NEGATIVE STATE IN ANTEPARTUM PERIOD: ICD-10-CM

## 2022-11-08 DIAGNOSIS — E07.9 THYROID DISEASE AFFECTING PREGNANCY: ICD-10-CM

## 2022-11-08 PROCEDURE — 99213 OFFICE O/P EST LOW 20 MIN: CPT | Performed by: OBSTETRICS & GYNECOLOGY

## 2022-11-08 ASSESSMENT — PATIENT HEALTH QUESTIONNAIRE - PHQ9
SUM OF ALL RESPONSES TO PHQ QUESTIONS 1-9: 0
SUM OF ALL RESPONSES TO PHQ9 QUESTIONS 1 & 2: 0
1. LITTLE INTEREST OR PLEASURE IN DOING THINGS: 0
2. FEELING DOWN, DEPRESSED OR HOPELESS: 0
SUM OF ALL RESPONSES TO PHQ QUESTIONS 1-9: 0

## 2022-11-08 NOTE — ASSESSMENT & PLAN NOTE
Instructed pt to contact the office or seek immediate care if develops fever > 101.0, severe lower abdominal pain or heavy vaginal bleeding (soaking 2 or more pads per hour). D/W pt at length genetic testing that is recommended by ACOG -- NIPT, Quad screen (for trisomies and NTD), CF, SMA. Brief discussion of these diseases - conditions that may increase risks, etiology, carrier states, fetal effects, treatment options, etc was undertaken. D/W pt that these are screening tests only and are NOT mandatory. It is her decision whether to have them done and how to proceed with the information afterwards. All questions answered, pt understood and wishes to proceed with indicated tests. not examined

## 2022-11-08 NOTE — PROGRESS NOTES
Patient comes in today for routine prenatal visit. No complaints/concerns today.      Fetal Movement: No  Contractions: No  Vaginal Bleeding: No  Leaking Fluid: No  GI/: No    Vitals:    11/08/22 1059   BP: 116/70   Site: Left Upper Arm   Position: Sitting   Weight: 177 lb (80.3 kg)   Height: 5' 2\" (1.575 m)

## 2022-11-08 NOTE — PROGRESS NOTES
Chief Complaint   Patient presents with    Routine Prenatal Visit        This 23 y.o. [id number] at 13w0d with Estimated Date of Delivery: 5/16/23 presents for routine prenatal visit. Patient has no complaints today. Pt reports no LOF, VB, ctx. Pt denies H/A, vision changes, abdom pain, N/V. Vitals:    11/08/22 1059   BP: 116/70   Site: Left Upper Arm   Position: Sitting   Weight: 177 lb (80.3 kg)   Height: 5' 2\" (1.575 m)        Patient Active Problem List    Diagnosis Date Noted    Multigravida in second trimester 10/10/2022     Overview Note:     EDC by LMP confirmed by 9 3/7 week US    1/14/2020: CF/SMA negative       Assessment & Plan Note:     Instructed pt to contact the office or seek immediate care if develops fever > 101.0, severe lower abdominal pain or heavy vaginal bleeding (soaking 2 or more pads per hour). D/W pt at length genetic testing that is recommended by ACOG -- NIPT, Quad screen (for trisomies and NTD), CF, SMA. Brief discussion of these diseases - conditions that may increase risks, etiology, carrier states, fetal effects, treatment options, etc was undertaken. D/W pt that these are screening tests only and are NOT mandatory. It is her decision whether to have them done and how to proceed with the information afterwards. All questions answered, pt understood and wishes to proceed with indicated tests.  Rh negative state in antepartum period 11/22/2019     Overview Note:     Rhogam at 28 weeks and PP if indicated         Assessment & Plan Note:     noted      Thyroid disease affecting pregnancy 11/20/2019     Overview Note:     11/20/2019: Seen by Dr Joni Swanson in 2018 for Hashimoto's Thyroiditis not requiring treatment.    10/10/2022: no treatment, labs wnl    PLAN:  Labs q trimester - mgmt accordingly           Assessment & Plan Note:     noted      Hashimoto's thyroiditis       Problem List Items Addressed This Visit        Endocrine    Thyroid disease affecting pregnancy noted            Other    Rh negative state in antepartum period     noted         Multigravida in second trimester     Instructed pt to contact the office or seek immediate care if develops fever > 101.0, severe lower abdominal pain or heavy vaginal bleeding (soaking 2 or more pads per hour). D/W pt at length genetic testing that is recommended by ACOG -- NIPT, Quad screen (for trisomies and NTD), CF, SMA. Brief discussion of these diseases - conditions that may increase risks, etiology, carrier states, fetal effects, treatment options, etc was undertaken. D/W pt that these are screening tests only and are NOT mandatory. It is her decision whether to have them done and how to proceed with the information afterwards. All questions answered, pt understood and wishes to proceed with indicated tests.               Talita Nieves MD     11:19 AM    11/08/22

## 2022-11-08 NOTE — PATIENT INSTRUCTIONS
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   So, I'll put in a referral if you would like to pain/spine specialist for treatment with injections or with surgery.   It can also be treated if pain is not severe, with antiinflammatory like ibuprofen or something stronger like diclofenac and nerve pain medication like gabapentin.   Also PT can help would you like a referral?  Luis Woods M.D.      We will call you if anything is abnormal from your testing today. Please contact the office or seek immediate care if you develop fever > 101.0, severe lower abdominal pain or heavy vaginal bleeding (soaking 2 or more pads per hour). Thanks for coming to see us today and letting us take care of you!

## 2022-11-17 ENCOUNTER — TELEPHONE (OUTPATIENT)
Dept: OBGYN CLINIC | Age: 22
End: 2022-11-17

## 2022-11-17 DIAGNOSIS — Z34.82 MULTIGRAVIDA IN SECOND TRIMESTER: ICD-10-CM

## 2022-11-17 LAB
NIPT, EXTERNAL: NORMAL
NIPT, EXTERNAL: NORMAL

## 2022-11-17 NOTE — TELEPHONE ENCOUNTER
Please let pt know her genetics screening tests were all normal    NIPT neg x3    If she wants to know the sex of the baby it is a baby boy

## 2022-11-17 NOTE — TELEPHONE ENCOUNTER
Called pt and informed her of genetic screening results that were normal. Pt also informed of the gender. Pt voiced understanding.

## 2022-12-08 ENCOUNTER — ROUTINE PRENATAL (OUTPATIENT)
Dept: OBGYN CLINIC | Age: 22
End: 2022-12-08
Payer: MEDICAID

## 2022-12-08 VITALS
SYSTOLIC BLOOD PRESSURE: 118 MMHG | HEIGHT: 62 IN | DIASTOLIC BLOOD PRESSURE: 72 MMHG | WEIGHT: 180 LBS | BODY MASS INDEX: 33.13 KG/M2

## 2022-12-08 DIAGNOSIS — O26.899 RH NEGATIVE STATE IN ANTEPARTUM PERIOD: ICD-10-CM

## 2022-12-08 DIAGNOSIS — Z34.82 MULTIGRAVIDA IN SECOND TRIMESTER: Primary | ICD-10-CM

## 2022-12-08 DIAGNOSIS — Z34.82 MULTIGRAVIDA IN SECOND TRIMESTER: ICD-10-CM

## 2022-12-08 DIAGNOSIS — E07.9 THYROID DISEASE AFFECTING PREGNANCY: ICD-10-CM

## 2022-12-08 DIAGNOSIS — O99.280 THYROID DISEASE AFFECTING PREGNANCY: ICD-10-CM

## 2022-12-08 DIAGNOSIS — Z67.91 RH NEGATIVE STATE IN ANTEPARTUM PERIOD: ICD-10-CM

## 2022-12-08 DIAGNOSIS — O26.892 VAGINAL DISCHARGE DURING PREGNANCY IN SECOND TRIMESTER: ICD-10-CM

## 2022-12-08 DIAGNOSIS — N89.8 VAGINAL DISCHARGE DURING PREGNANCY IN SECOND TRIMESTER: ICD-10-CM

## 2022-12-08 PROCEDURE — 99213 OFFICE O/P EST LOW 20 MIN: CPT | Performed by: NURSE PRACTITIONER

## 2022-12-08 NOTE — PROGRESS NOTES
This is a 23 y.o.  [id number] at 17w2d for routine OB visit. Her Estimated Due Date is 2023, by Last Menstrual Period    Denies leaking of fluid, vaginal bleeding, or regular contractions. Reports fetal movement. Pt c/o vaginal discharge and odor. No itching. Recently treated for BV    Current Outpatient Medications on File Prior to Visit   Medication Sig Dispense Refill    Prenatal Vit-Fe Fumarate-FA (PRENATAL VITAMIN) 27-0.8 MG TABS Take 1 tablet by mouth daily 90 tablet 3     No current facility-administered medications on file prior to visit. No Known Allergies    OB History    Para Term  AB Living   4 1 1 0 2 1   SAB IAB Ectopic Molar Multiple Live Births   2 0 0 0 0 1       # 1 - Date: None, Sex: None, Weight: None, GA: None, Delivery: None, Apgar1: None, Apgar5: None, Living: None, Birth Comments: None    # 2 - Date: 2019, Sex: None, Weight: None, GA: 14w0d, Delivery: None, Apgar1: None, Apgar5: None, Living: None, Birth Comments: None    # 3 - Date: 20, Sex: Female, Weight: 6 lb 11.6 oz (3.05 kg), GA: 39w2d, Delivery: Vaginal, Spontaneous, Apgar1: 7, Apgar5: 9, Living: Living, Birth Comments: None    # 4 - Date: None, Sex: None, Weight: None, GA: None, Delivery: None, Apgar1: None, Apgar5: None, Living: None, Birth Comments: None        Past Medical History:   Diagnosis Date    Hashimoto's thyroiditis        Past Surgical History:   Procedure Laterality Date    DILATION AND CURETTAGE OF UTERUS  2019       Family History   Problem Relation Age of Onset    Uterine Cancer Neg Hx     Thyroid Disease Paternal Grandmother         hyperthyroidism?  Thyroid Disease Sister         hypothyroidism?  Thyroid Disease Paternal Aunt         hyperthyroidism?  Thyroid Disease Mother         hyperthyroidism?     Colon Cancer Neg Hx     Ovarian Cancer Neg Hx     Breast Cancer Neg Hx     Hypertension Father        Social History     Socioeconomic History    Marital status: Unknown     Spouse name: Not on file    Number of children: Not on file    Years of education: Not on file    Highest education level: Not on file   Occupational History    Not on file   Tobacco Use    Smoking status: Never    Smokeless tobacco: Never   Substance and Sexual Activity    Alcohol use: Not Currently    Drug use: Never    Sexual activity: Not Currently   Other Topics Concern    Not on file   Social History Narrative    Abuse: Feels safe at home, no history of physical abuse, no history of sexual abuse       Social Determinants of Health     Financial Resource Strain: Not on file   Food Insecurity: Not on file   Transportation Needs: Not on file   Physical Activity: Not on file   Stress: Not on file   Social Connections: Not on file   Intimate Partner Violence: Not on file   Housing Stability: Not on file           Objective    Vitals:    12/08/22 1412   BP: 118/72   Site: Left Upper Arm   Position: Sitting   Weight: 180 lb (81.6 kg)   Height: 5' 2\" (1.575 m)       General: well developed, well nourished, in no acute distress    Head: normocephalic and atraumatic    Resp: even and unlabored    Pelvic Exam:       External: normal female genitalia without lesions or masses       Vagina: normal without lesions or masses, scant white discharge noted      Cervix: normal without lesions or masses     Psych: Normal mood and affect        Assessment and Plan      Patient Active Problem List    Diagnosis Date Noted    Vaginal discharge during pregnancy 12/08/2022     Priority: Medium     Assessment & Plan Note:     Exam benign  nuswab collected      Multigravida in second trimester 10/10/2022     Priority: Medium     Overview Note:     EDC by LMP confirmed by 9 3/7 week US    1/14/2020: CF/SMA negative  11/08/2022: NIPT negx3, male       Assessment & Plan Note:     PTL/labor precautions, [facility], and pregnancy warning signs reviewed.  Pt advised to call the office at [phone] or go straight to Labor and Delivery at [facility] with any of the following concerns vaginal bleeding, leaking of fluid, jer regularly Q 5-7 minutes for over an hour or not feeling the baby move. RTO 4 weeks OBV, US  AFP today        Rh negative state in antepartum period 11/22/2019     Overview Note:     Rhogam at 28 weeks and PP if indicated         Assessment & Plan Note:     noted      Thyroid disease affecting pregnancy 11/20/2019     Overview Note:     11/20/2019: Seen by Dr Marlena Escalona in 2018 for Hashimoto's Thyroiditis not requiring treatment. 10/10/2022: no treatment, labs wnl    PLAN:  Labs q trimester - mgmt accordingly           Assessment & Plan Note:     Noted, thyroid studies today      Hashimoto's thyroiditis        Problem List Items Addressed This Visit        Endocrine    Thyroid disease affecting pregnancy     Noted, thyroid studies today            Other    Multigravida in second trimester - Primary     PTL/labor precautions, [address], and pregnancy warning signs reviewed. Pt advised to call the office at [phone] or go straight to Labor and Delivery at [facility] with any of the following concerns vaginal bleeding, leaking of fluid, jer regularly Q 5-7 minutes for over an hour or not feeling the baby move.    RTO 4 weeks OBV, US  AFP today           Relevant Orders    Alpha Fetoprotein, Maternal    TSH    T4, Free    Nuswab Vaginitis Plus (VG+)    Vaginal discharge during pregnancy     Exam benign  nuswab collected         Relevant Orders    Nuswab Vaginitis Plus (VG+)    Rh negative state in antepartum period     noted            Orders Placed This Encounter   Procedures    Nuswab Vaginitis Plus (VG+)    Alpha Fetoprotein, Maternal    TSH    T4, Free       Outpatient Encounter Medications as of 12/8/2022   Medication Sig Dispense Refill    Prenatal Vit-Fe Fumarate-FA (PRENATAL VITAMIN) 27-0.8 MG TABS Take 1 tablet by mouth daily 90 tablet 3     No facility-administered encounter medications on file as of 12/8/2022.                Labor signs, pregnancy warning signs, and fetal movement counting reviewed (if applicable)        Dillon Chisholm NP, APRN - CNP 12/08/22 2:48 PM

## 2022-12-08 NOTE — PROGRESS NOTES
Patient comes in today for routine prenatal visit. Pt reports discharge and a weird odor. She reports it does not happen all the time.      Fetal Movement: Yes  Contractions: No  Vaginal Bleeding: No  Leaking Fluid: No  GI/: No    Vitals:    12/08/22 1412   BP: 118/72   Site: Left Upper Arm   Position: Sitting   Weight: 180 lb (81.6 kg)   Height: 5' 2\" (1.575 m)

## 2022-12-08 NOTE — ASSESSMENT & PLAN NOTE
PTL/labor precautions, [address], and pregnancy warning signs reviewed. Pt advised to call the office at [phone] or go straight to Labor and Delivery at [facility] with any of the following concerns vaginal bleeding, leaking of fluid, jer regularly Q 5-7 minutes for over an hour or not feeling the baby move.    RTO 4 weeks OBV, US  AFP today

## 2022-12-09 LAB
T4 FREE SERPL-MCNC: 0.9 NG/DL (ref 0.78–1.46)
TSH, 3RD GENERATION: 0.57 UIU/ML (ref 0.36–3.74)

## 2022-12-09 NOTE — PROGRESS NOTES
I have reviewed the patient's visit today including history, exam and assessment by FATOUMATA Garner. I agree with treatment/plan as above.     Rosalind Camp MD  11:36 PM  12/08/22

## 2022-12-11 LAB
AFP INTERP SERPL-IMP: NORMAL
AFP MOM SERPL: 1.5
AFP SERPL-MCNC: 55.6 NG/ML
AGE AT DELIVERY: 25.2 YR
COMMENT: NORMAL
GA METHOD: NORMAL
GA: 17.3 WEEKS
IDDM PATIENT QL: NO
Lab: NORMAL
MAT SCN FOR FETAL ABNORMALITIES SERPL: NORMAL
MULTIPLE PREGNANCY: NO
NEURAL TUBE DEFECT RISK FETUS: 2734

## 2023-01-06 ENCOUNTER — ROUTINE PRENATAL (OUTPATIENT)
Dept: OBGYN CLINIC | Age: 23
End: 2023-01-06

## 2023-01-06 VITALS
HEIGHT: 62 IN | SYSTOLIC BLOOD PRESSURE: 122 MMHG | DIASTOLIC BLOOD PRESSURE: 70 MMHG | BODY MASS INDEX: 33.49 KG/M2 | WEIGHT: 182 LBS

## 2023-01-06 DIAGNOSIS — O99.280 THYROID DISEASE AFFECTING PREGNANCY: ICD-10-CM

## 2023-01-06 DIAGNOSIS — Z36.89 ENCOUNTER FOR FETAL ANATOMIC SURVEY: Primary | ICD-10-CM

## 2023-01-06 DIAGNOSIS — O26.899 RH NEGATIVE STATE IN ANTEPARTUM PERIOD: ICD-10-CM

## 2023-01-06 DIAGNOSIS — Z67.91 RH NEGATIVE STATE IN ANTEPARTUM PERIOD: ICD-10-CM

## 2023-01-06 DIAGNOSIS — E07.9 THYROID DISEASE AFFECTING PREGNANCY: ICD-10-CM

## 2023-01-06 DIAGNOSIS — Z34.82 MULTIGRAVIDA IN SECOND TRIMESTER: ICD-10-CM

## 2023-01-06 ASSESSMENT — PATIENT HEALTH QUESTIONNAIRE - PHQ9
SUM OF ALL RESPONSES TO PHQ QUESTIONS 1-9: 0
SUM OF ALL RESPONSES TO PHQ9 QUESTIONS 1 & 2: 0
SUM OF ALL RESPONSES TO PHQ QUESTIONS 1-9: 0
2. FEELING DOWN, DEPRESSED OR HOPELESS: 0
1. LITTLE INTEREST OR PLEASURE IN DOING THINGS: 0

## 2023-01-06 NOTE — PROGRESS NOTES
Chief Complaint   Patient presents with    Routine Prenatal Visit    Ultrasound        This 23 y.o. [id number] at 21w3d with Estimated Date of Delivery: 23 presents for routine prenatal visit. Patient has no complaints today. Pt reports good FM, no LOF, VB, ctx. Pt denies H/A, vision changes, abdom pain, N/V. Vitals:    23 1103   BP: 122/70   Site: Left Upper Arm   Position: Sitting   Weight: 182 lb (82.6 kg)   Height: 5' 2\" (1.575 m)        Patient Active Problem List    Diagnosis Date Noted    Multigravida in second trimester 10/10/2022     Overview Note:     EDC by LMP confirmed by 9 3/7 week US    2020: CF/SMA negative  2022: NIPT negx3, male  22:  AFP only neg       Assessment & Plan Note:     Educated patient of signs and symptoms of  labor including but not limited to regular uterine contractions every 5-7 minutes for 1 hour, vaginal bleeding or leakage of fluid to seek immediate care.  Rh negative state in antepartum period 2019     Overview Note:     Rhogam at 28 weeks and PP if indicated         Assessment & Plan Note:     noted      Thyroid disease affecting pregnancy 2019     Overview Note:     2019: Seen by Dr Avani Denny in 2018 for Hashimoto's Thyroiditis not requiring treatment.    10/10/2022: no treatment, labs wnl  22: TSH 0.573    PLAN:  Labs q trimester - mgmt accordingly, serial growth in 3rd trimester           Assessment & Plan Note:     noted      Hashimoto's thyroiditis       Problem List Items Addressed This Visit        Endocrine    Thyroid disease affecting pregnancy     noted         Relevant Orders    AMB POC US OB >= 14 WKS, 1ST GESTATION (Completed)       Other    Rh negative state in antepartum period     noted         Multigravida in second trimester     Educated patient of signs and symptoms of  labor including but not limited to regular uterine contractions every 5-7 minutes for 1 hour, vaginal bleeding or leakage of fluid to seek immediate care.           Relevant Orders    AMB POC US OB >= 14 WKS, 1ST GESTATION (Completed)   Other Visit Diagnoses     Encounter for fetal anatomic survey    -  Primary    Relevant Orders    AMB POC US OB >= 14 WKS, 1ST GESTATION (Completed)           Vesna Domínguez MD     11:28 AM    01/06/23

## 2023-01-06 NOTE — PROGRESS NOTES
Patient comes in today for routine prenatal visit. No complaints/concerns today.      Fetal Movement: Yes  Contractions: No  Vaginal Bleeding: No  Leaking Fluid: No  GI/: No    Vitals:    01/06/23 1103   BP: 122/70   Site: Left Upper Arm   Position: Sitting   Weight: 182 lb (82.6 kg)   Height: 5' 2\" (1.575 m)

## 2023-01-09 ENCOUNTER — HOSPITAL ENCOUNTER (OUTPATIENT)
Age: 23
Discharge: HOME | End: 2023-01-09
Attending: OBSTETRICS & GYNECOLOGY | Admitting: OBSTETRICS & GYNECOLOGY
Payer: MEDICAID

## 2023-01-09 VITALS
HEART RATE: 105 BPM | SYSTOLIC BLOOD PRESSURE: 120 MMHG | TEMPERATURE: 98.2 F | RESPIRATION RATE: 17 BRPM | DIASTOLIC BLOOD PRESSURE: 66 MMHG

## 2023-01-09 PROCEDURE — 99282 EMERGENCY DEPT VISIT SF MDM: CPT

## 2023-01-09 NOTE — PROGRESS NOTES
Pt d/adriel to home with discharge instructions. Pt verbalized understanding.  Pt left unit ambulatory without any complaints

## 2023-01-09 NOTE — PROGRESS NOTES
Pt was seen at [facility] on 01/09/2023. Angelic Fears was with her as her care partner. Please excuse from work otday.     Maine Zamora RN

## 2023-01-09 NOTE — H&P
CC: cramping      23 y.o. female [id number] at 21w6d  weeks gestation who requests evaluation for cramping 45 minutes apart, not now. No uti sx, no vb or lof. Patient declined vaginal exam.    Her pregnancy issues include: rh neg, hx of hashimotos      HISTORY:  OB History    Para Term  AB Living   4 1 1   2 1   SAB IAB Ectopic Molar Multiple Live Births   2         1      # Outcome Date GA Lbr Alejandro/2nd Weight Sex Delivery Anes PTL Lv   4 Current            3 Term 20 39w2d  6 lb 11.6 oz (3.05 kg) F Vag-Spont EPI N THAIS   2 SAB 2019 14w0d          1 SAB                Past Surgical History:   Procedure Laterality Date    DILATION AND CURETTAGE OF UTERUS  2019       Past Medical History:   Diagnosis Date    Hashimoto's thyroiditis        No Known Allergies    Family History   Problem Relation Age of Onset    Uterine Cancer Neg Hx     Thyroid Disease Paternal Grandmother         hyperthyroidism? Thyroid Disease Sister         hypothyroidism? Thyroid Disease Paternal Aunt         hyperthyroidism? Thyroid Disease Mother         hyperthyroidism?     Colon Cancer Neg Hx     Ovarian Cancer Neg Hx     Breast Cancer Neg Hx     Hypertension Father        Social History     Socioeconomic History    Marital status: Unknown     Spouse name: Not on file    Number of children: Not on file    Years of education: Not on file    Highest education level: Not on file   Occupational History    Not on file   Tobacco Use    Smoking status: Never    Smokeless tobacco: Never   Substance and Sexual Activity    Alcohol use: Not Currently    Drug use: Never    Sexual activity: Not Currently   Other Topics Concern    Not on file   Social History Narrative    Abuse: Feels safe at home, no history of physical abuse, no history of sexual abuse       Social Determinants of Health     Financial Resource Strain: Not on file   Food Insecurity: Not on file   Transportation Needs: Not on file   Physical Activity: Not on file Stress: Not on file   Social Connections: Not on file   Intimate Partner Violence: Not on file   Housing Stability: Not on file       ROS:  Negative:   negative 10 point ROS except as noted in HPI    Positive:   per hpi    PHYSICAL EXAM:  Blood pressure 120/66, pulse (!) 105, temperature 98.2 °F (36.8 °C), temperature source Oral, resp. rate 17, last menstrual period 2022. The patient appears well, alert, oriented x 3. Appropriate affect. Lungs are clear. Heart RRR, no murmurs. Abdomen soft, non-tender, no rebound/guarding, normoactive bs. Fundus soft and non tender  Skin warm, dry, no rashes  Ext no edema, DTR's normal    Cervix: deferred    Fetal Heart Rate: doppler  No contractions seen      I have personally reviewed the patient's history, prenatal record, and pertinent test results. vital sign trends, radiology reports, laboratory results, and previous provider notes support my clinical impression. Assessment:  23 y.o. female at 21w6d  Hx of cramping, no resolved. No s/s of active ptl  Reassuring fetal status    Plan:  Findings and test results were discussed. Discharge to home with routine instructions about  contractions.    Time spent with patient consistent with level of care    Signed By:  Chan Guo MD     2023

## 2023-01-19 PROCEDURE — 96374 THER/PROPH/DIAG INJ IV PUSH: CPT | Performed by: STUDENT IN AN ORGANIZED HEALTH CARE EDUCATION/TRAINING PROGRAM

## 2023-01-19 PROCEDURE — 99284 EMERGENCY DEPT VISIT MOD MDM: CPT | Performed by: STUDENT IN AN ORGANIZED HEALTH CARE EDUCATION/TRAINING PROGRAM

## 2023-01-20 ENCOUNTER — HOSPITAL ENCOUNTER (EMERGENCY)
Age: 23
Discharge: HOME | End: 2023-01-20
Attending: STUDENT IN AN ORGANIZED HEALTH CARE EDUCATION/TRAINING PROGRAM
Payer: MEDICAID

## 2023-01-20 VITALS
BODY MASS INDEX: 33.13 KG/M2 | TEMPERATURE: 98.8 F | HEIGHT: 62 IN | HEART RATE: 78 BPM | RESPIRATION RATE: 16 BRPM | OXYGEN SATURATION: 99 % | WEIGHT: 180 LBS | DIASTOLIC BLOOD PRESSURE: 74 MMHG | SYSTOLIC BLOOD PRESSURE: 132 MMHG

## 2023-01-20 DIAGNOSIS — R11.0 NAUSEA: Primary | ICD-10-CM

## 2023-01-20 DIAGNOSIS — E87.6 HYPOKALEMIA: ICD-10-CM

## 2023-01-20 DIAGNOSIS — R42 DIZZINESS: ICD-10-CM

## 2023-01-20 LAB
ANION GAP SERPL CALC-SCNC: 14 MMOL/L (ref 2–11)
BASOPHILS # BLD: 0 K/UL (ref 0–0.2)
BASOPHILS NFR BLD: 0 % (ref 0–2)
BUN SERPL-MCNC: 4 MG/DL (ref 7–18)
CALCIUM SERPL-MCNC: 9.1 MG/DL (ref 8.3–10.4)
CHLORIDE SERPL-SCNC: 107 MMOL/L (ref 98–107)
CO2 SERPL-SCNC: 20 MMOL/L (ref 21–32)
CREAT SERPL-MCNC: 0.34 MG/DL (ref 0.6–1)
DIFFERENTIAL METHOD BLD: ABNORMAL
EOSINOPHIL # BLD: 0.2 K/UL (ref 0–0.8)
EOSINOPHIL NFR BLD: 2 % (ref 0.5–7.8)
ERYTHROCYTE [DISTWIDTH] IN BLOOD BY AUTOMATED COUNT: 14.3 % (ref 11.9–14.6)
GLUCOSE SERPL-MCNC: 97 MG/DL (ref 65–100)
HCT VFR BLD AUTO: 35 % (ref 35.8–46.3)
HGB BLD-MCNC: 11.6 G/DL (ref 11.7–15.4)
IMM GRANULOCYTES # BLD AUTO: 0.1 K/UL (ref 0–0.5)
IMM GRANULOCYTES NFR BLD AUTO: 1 % (ref 0–5)
LYMPHOCYTES # BLD: 1.6 K/UL (ref 0.5–4.6)
LYMPHOCYTES NFR BLD: 16 % (ref 13–44)
MCH RBC QN AUTO: 27.7 PG (ref 26.1–32.9)
MCHC RBC AUTO-ENTMCNC: 33.1 G/DL (ref 31.4–35)
MCV RBC AUTO: 83.5 FL (ref 82–102)
MONOCYTES # BLD: 0.5 K/UL (ref 0.1–1.3)
MONOCYTES NFR BLD: 5 % (ref 4–12)
NEUTS SEG # BLD: 7.9 K/UL (ref 1.7–8.2)
NEUTS SEG NFR BLD: 76 % (ref 43–78)
NRBC # BLD: 0 K/UL (ref 0–0.2)
PLATELET # BLD AUTO: 134 K/UL (ref 150–450)
PMV BLD AUTO: 12.1 FL (ref 9.4–12.3)
POTASSIUM SERPL-SCNC: 3.1 MMOL/L (ref 3.5–5.1)
RBC # BLD AUTO: 4.19 M/UL (ref 4.05–5.2)
SODIUM SERPL-SCNC: 141 MMOL/L (ref 133–143)
WBC # BLD AUTO: 10.4 K/UL (ref 4.3–11.1)

## 2023-01-20 PROCEDURE — 96374 THER/PROPH/DIAG INJ IV PUSH: CPT | Performed by: STUDENT IN AN ORGANIZED HEALTH CARE EDUCATION/TRAINING PROGRAM

## 2023-01-20 PROCEDURE — 2580000003 HC RX 258: Performed by: STUDENT IN AN ORGANIZED HEALTH CARE EDUCATION/TRAINING PROGRAM

## 2023-01-20 PROCEDURE — 85025 COMPLETE CBC W/AUTO DIFF WBC: CPT

## 2023-01-20 PROCEDURE — 6360000002 HC RX W HCPCS: Performed by: STUDENT IN AN ORGANIZED HEALTH CARE EDUCATION/TRAINING PROGRAM

## 2023-01-20 PROCEDURE — 80048 BASIC METABOLIC PNL TOTAL CA: CPT

## 2023-01-20 RX ORDER — ONDANSETRON 2 MG/ML
4 INJECTION INTRAMUSCULAR; INTRAVENOUS ONCE
Status: COMPLETED | OUTPATIENT
Start: 2023-01-20 | End: 2023-01-20

## 2023-01-20 RX ORDER — 0.9 % SODIUM CHLORIDE 0.9 %
1000 INTRAVENOUS SOLUTION INTRAVENOUS ONCE
Status: COMPLETED | OUTPATIENT
Start: 2023-01-20 | End: 2023-01-20

## 2023-01-20 RX ORDER — ONDANSETRON 4 MG/1
4 TABLET, FILM COATED ORAL 3 TIMES DAILY PRN
Qty: 15 TABLET | Refills: 0 | Status: SHIPPED | OUTPATIENT
Start: 2023-01-20

## 2023-01-20 RX ADMIN — ONDANSETRON 4 MG: 2 INJECTION INTRAMUSCULAR; INTRAVENOUS at 00:44

## 2023-01-20 RX ADMIN — SODIUM CHLORIDE 1000 ML: 9 INJECTION, SOLUTION INTRAVENOUS at 00:48

## 2023-01-20 ASSESSMENT — ENCOUNTER SYMPTOMS
VOMITING: 0
ABDOMINAL PAIN: 0
NAUSEA: 1

## 2023-01-20 ASSESSMENT — LIFESTYLE VARIABLES
HOW MANY STANDARD DRINKS CONTAINING ALCOHOL DO YOU HAVE ON A TYPICAL DAY: PATIENT DOES NOT DRINK
HOW OFTEN DO YOU HAVE A DRINK CONTAINING ALCOHOL: NEVER

## 2023-01-20 ASSESSMENT — PAIN SCALES - GENERAL: PAINLEVEL_OUTOF10: 0

## 2023-01-20 ASSESSMENT — PAIN - FUNCTIONAL ASSESSMENT: PAIN_FUNCTIONAL_ASSESSMENT: 0-10

## 2023-01-20 NOTE — ED PROVIDER NOTES
[address] [facility]  Free-standing Emergency Department    HPI   Laura Gallo is a 23 y.o. female  at approximately 23 weeks gestation  who presents to the ED with complaint of dizziness. Patient reports a 2-day history of lightheadedness associated with nausea. Symptoms became worse this evening after he ate some chicken nuggets for [employer]. No one else had this meal or had similar symptoms. States she felt little lightheaded last night when going to sleep. She did not have nausea at that time but had some nausea that started today. Denies chest pain, shortness of breath, abdominal pain, vaginal bleeding, vaginal discharge, fevers. Does feel that her dizziness has improved. She was unable to fully describe her lightheadedness. Denies room spinning type dizziness. ROS   Review of Systems   Gastrointestinal:  Positive for nausea. Negative for abdominal pain and vomiting. Genitourinary:  Negative for vaginal bleeding. Neurological:  Positive for dizziness. History     Past Medical History:   Diagnosis Date    Hashimoto's thyroiditis      Past Surgical History:   Procedure Laterality Date    DILATION AND CURETTAGE OF UTERUS  2019     Family History   Problem Relation Age of Onset    Uterine Cancer Neg Hx     Thyroid Disease Paternal Grandmother         hyperthyroidism? Thyroid Disease Sister         hypothyroidism? Thyroid Disease Paternal Aunt         hyperthyroidism? Thyroid Disease Mother         hyperthyroidism? Colon Cancer Neg Hx     Ovarian Cancer Neg Hx     Breast Cancer Neg Hx     Hypertension Father      No Known Allergies    Physical Exam     Vitals:    23 0006   BP: 136/78   Pulse: 96   Resp: 16   Temp: 98.8 °F (37.1 °C)   TempSrc: Oral   SpO2: 100%   Weight: 180 lb (81.6 kg)   Height: 5' 2\" (1.575 m)     Nursing note and vitals reviewed.     Constitutional: Well developed, NAD  HEENT: Atraumatic, conjugate gaze, EOM intact  Neck: Supple  Cardiovascular: No cyanosis, diaphoresis, or JVD appreciated. Respiratory: Effort normal. No respiratory distress. Gastrointestinal: Bowel sounds present. Non-distended. No guarding or rebound. Non-tender. Gravid uterus at level of umbilicus  MSK: No deformities appreciated. No peripheral edema. Skin: Skin is warm and dry. No rash appreciated. Neuro: Alert and oriented, moves all four extremities. 5/5 strength. Sensation intact throughout. Cranial nerves II through XII intact. Psych: Pleasant and cooperative. Procedures   Procedures    MDM     Labs Reviewed   CBC WITH AUTO DIFFERENTIAL - Abnormal; Notable for the following components:       Result Value    Hemoglobin 11.6 (*)     Hematocrit 35.0 (*)     Platelets 603 (*)     All other components within normal limits   BASIC METABOLIC PANEL - Abnormal; Notable for the following components:    Potassium 3.1 (*)     CO2 20 (*)     Anion Gap 14 (*)     BUN 4 (*)     Creatinine 0.34 (*)     All other components within normal limits     Medications   0.9 % sodium chloride bolus (has no administration in time range)   ondansetron (ZOFRAN) injection 4 mg (has no administration in time range)     No orders to display       Medical Decision Making  Amount and/or Complexity of Data Reviewed  Labs: ordered. Risk  Prescription drug management. Complexity of Problem: 2 or more self-limited or minor problems. (3)  I have conducted an independent ordering and review of Labs. ED Course     ED Course as of 01/20/23 0226 Fri Jan 20, 2023   266 55-year-old female at approximately 3 weeks gestation presents with lightheadedness and nausea after eating a meal for [employer]. Neuro intact. Seizure decision-making with patient, will obtain blood work to evaluate hemoglobin and electrolytes and give IV fluids/antiemetics.  [ER]   0151 Bloodwork with mild hypokalemia and elevated AG otherwise normal; BGL normal. Feels improved following fluids and antiemetics. Discussed increasing potassium in diet as well as increasing fluids. Encouraged to followup with PCP and OBGYN in the next 1-2 weeks for recheck of symptoms and to recheck potassium. Return precautions given. [ER]      ED Course User Index  [ER] Juventino Carroll MD       Voice dictation software was used during the making of this note. This software is not perfect and grammatical and other typographical errors may be present. This note has not been completely proofread for errors.     Electronically signed by:  MD Juventino Decker MD  01/20/23 7404

## 2023-01-20 NOTE — Clinical Note
Paris Bills was seen and treated in our emergency department on 1/19/2023.  She may return to work on 01/21/2023.  Please excuse her from work until 1/21/2023     If you have any questions or concerns, please don't hesitate to call.      Thong Hyde MD

## 2023-01-20 NOTE — DISCHARGE INSTRUCTIONS
You were seen in the [facility] Emergency Department on 1/20/2023    Based on your history, physical examination, the labwork and imaging performed, it does not appear that there is any life threatening medical or surgical emergency requiring further observation, evaluation, consultation or admission at this time. You will be given: Prescription(s) and Work note      Recommendations: Patient to drink any fluids. I recommend avoiding [employer] chicken nuggets in the future. Please follow-up with your OB/GYN in the next 1 to 2 weeks. Return to the ER for any new or worsening symptoms    Your potassium levels were mildly low today. Be sure to increase potassium in your diet through things such as bananas, spinach, leafy/green vegetables. I do recommend having your potassium rechecked in the next few weeks. We feel comfortable discharging you with close followup with your primary care provider (None Provider). I recommend you follow up in the next week. You should return to the ER immediately if your symptoms change or significantly worsen. Symptoms to watch for include new or worsening: chest pain, shortness of breath or difficulty breathing, fevers, chills, bleeding, vomiting (particularly that won't stop), an inability to keep down any liquids, signs of dehydration (including decreased urine output) or any other symptoms that are concerning to you. We would love to help you get a primary care doctor for follow-up after your emergency department visit. Please call [phone] between 7AM - 6PM Monday to Friday. A care navigator will be able to assist you with setting up a doctor close to your home.     Thank you for letting us be a part of your care--

## 2023-01-20 NOTE — ED TRIAGE NOTES
Pt to the ED from home with c/o of nausea and dizziness. Pt states that she is 22 weeks pregnant. . Pt states that last night after she ate dinner she started to get nausea. Pt states that no one else in the house is sick. Pt states that fetus is still active, no abd cramping, no vag bleeding. Pt reports that she has not had any morning sickness in this pregnancy.      Pt is seen by [facility]

## 2023-01-20 NOTE — LETTER
[facility] EMERGENCY DEPT  [address] [place] [address]  Sam Encinas [address]  Phone: [phone]               January 20, 2023    Patient: Melissa Smith   YOB: 2000   Date of Visit: 1/19/2023       To Whom It May Concern:    Denise Kady was seen and treated in our emergency department on 1/19/2023. Kaylee Bush may return to work on 1/20/2023.       Sincerely,       Elie Cueva RN         Signature:__________________________________

## 2023-01-20 NOTE — ED NOTES
I have reviewed discharge instructions with the patient. The patient verbalized understanding. Patient left ED via Discharge Method: ambulatory to Home with family. Opportunity for questions and clarification provided. Patient given 1 scripts. To continue your aftercare when you leave the hospital, you may receive an automated call from our care team to check in on how you are doing. This is a free service and part of our promise to provide the best care and service to meet your aftercare needs.  If you have questions, or wish to unsubscribe from this service please call [phone]. Thank you for Choosing our [facility] Emergency Department.        Jamari Garcias RN  01/20/23 9429

## 2023-02-06 ENCOUNTER — ROUTINE PRENATAL (OUTPATIENT)
Dept: OBGYN CLINIC | Age: 23
End: 2023-02-06
Payer: MEDICAID

## 2023-02-06 VITALS
HEIGHT: 62 IN | BODY MASS INDEX: 33.86 KG/M2 | SYSTOLIC BLOOD PRESSURE: 126 MMHG | DIASTOLIC BLOOD PRESSURE: 74 MMHG | WEIGHT: 184 LBS

## 2023-02-06 DIAGNOSIS — Z34.82 MULTIGRAVIDA IN SECOND TRIMESTER: Primary | ICD-10-CM

## 2023-02-06 DIAGNOSIS — O26.899 RH NEGATIVE STATE IN ANTEPARTUM PERIOD: ICD-10-CM

## 2023-02-06 DIAGNOSIS — Z36.2 ENCOUNTER FOR FOLLOW-UP ULTRASOUND OF FETAL ANATOMY: ICD-10-CM

## 2023-02-06 DIAGNOSIS — Z34.83 MULTIGRAVIDA IN THIRD TRIMESTER: ICD-10-CM

## 2023-02-06 DIAGNOSIS — O99.280 THYROID DISEASE AFFECTING PREGNANCY: ICD-10-CM

## 2023-02-06 DIAGNOSIS — Z67.91 RH NEGATIVE STATE IN ANTEPARTUM PERIOD: ICD-10-CM

## 2023-02-06 DIAGNOSIS — E07.9 THYROID DISEASE AFFECTING PREGNANCY: ICD-10-CM

## 2023-02-06 PROCEDURE — 76816 OB US FOLLOW-UP PER FETUS: CPT | Performed by: NURSE PRACTITIONER

## 2023-02-06 PROCEDURE — 99213 OFFICE O/P EST LOW 20 MIN: CPT | Performed by: NURSE PRACTITIONER

## 2023-02-06 SDOH — ECONOMIC STABILITY: FOOD INSECURITY: WITHIN THE PAST 12 MONTHS, YOU WORRIED THAT YOUR FOOD WOULD RUN OUT BEFORE YOU GOT MONEY TO BUY MORE.: NEVER TRUE

## 2023-02-06 SDOH — ECONOMIC STABILITY: FOOD INSECURITY: WITHIN THE PAST 12 MONTHS, THE FOOD YOU BOUGHT JUST DIDN'T LAST AND YOU DIDN'T HAVE MONEY TO GET MORE.: NEVER TRUE

## 2023-02-06 SDOH — ECONOMIC STABILITY: HOUSING INSECURITY
IN THE LAST 12 MONTHS, WAS THERE A TIME WHEN YOU DID NOT HAVE A STEADY PLACE TO SLEEP OR SLEPT IN A SHELTER (INCLUDING NOW)?: NO

## 2023-02-06 SDOH — ECONOMIC STABILITY: INCOME INSECURITY: HOW HARD IS IT FOR YOU TO PAY FOR THE VERY BASICS LIKE FOOD, HOUSING, MEDICAL CARE, AND HEATING?: NOT HARD AT ALL

## 2023-02-06 ASSESSMENT — PATIENT HEALTH QUESTIONNAIRE - PHQ9
2. FEELING DOWN, DEPRESSED OR HOPELESS: 0
1. LITTLE INTEREST OR PLEASURE IN DOING THINGS: 0
SUM OF ALL RESPONSES TO PHQ9 QUESTIONS 1 & 2: 0
SUM OF ALL RESPONSES TO PHQ QUESTIONS 1-9: 0

## 2023-02-06 NOTE — ASSESSMENT & PLAN NOTE
PTL/labor precautions, [facility], and pregnancy warning signs reviewed. Pt advised to call the office at [phone] or go straight to Labor and Delivery at [facility] with any of the following concerns vaginal bleeding, leaking of fluid, jer regularly Q 5-7 minutes for over an hour or not feeling the baby move.    RTO 2 weeks OBV, glucola, cbc, thyroid labs, rhogam/tdap

## 2023-02-06 NOTE — PROGRESS NOTES
This is a 23 y.o.  [id number] at 25w6d for routine OB visit. Her Estimated Due Date is 2023, by Last Menstrual Period    Denies leaking of fluid, vaginal bleeding, or regular contractions. Reports fetal movement. Current Outpatient Medications on File Prior to Visit   Medication Sig Dispense Refill    Prenatal Vit-Fe Fumarate-FA (PRENATAL VITAMIN) 27-0.8 MG TABS Take 1 tablet by mouth daily 90 tablet 3    ondansetron (ZOFRAN) 4 MG tablet Take 1 tablet by mouth 3 times daily as needed for Nausea or Vomiting (Patient not taking: Reported on 2023) 15 tablet 0     No current facility-administered medications on file prior to visit. No Known Allergies    OB History    Para Term  AB Living   4 1 1 0 2 1   SAB IAB Ectopic Molar Multiple Live Births   2 0 0 0 0 1       # 1 - Date: None, Sex: None, Weight: None, GA: None, Delivery: None, Apgar1: None, Apgar5: None, Living: None, Birth Comments: None    # 2 - Date: 2019, Sex: None, Weight: None, GA: 14w0d, Delivery: None, Apgar1: None, Apgar5: None, Living: None, Birth Comments: None    # 3 - Date: 20, Sex: Female, Weight: 6 lb 11.6 oz (3.05 kg), GA: 39w2d, Delivery: Vaginal, Spontaneous, Apgar1: 7, Apgar5: 9, Living: Living, Birth Comments: None    # 4 - Date: None, Sex: None, Weight: None, GA: None, Delivery: None, Apgar1: None, Apgar5: None, Living: None, Birth Comments: None        Past Medical History:   Diagnosis Date    Hashimoto's thyroiditis        Past Surgical History:   Procedure Laterality Date    DILATION AND CURETTAGE OF UTERUS  2019       Family History   Problem Relation Age of Onset    Uterine Cancer Neg Hx     Thyroid Disease Paternal Grandmother         hyperthyroidism?  Thyroid Disease Sister         hypothyroidism?  Thyroid Disease Paternal Aunt         hyperthyroidism?  Thyroid Disease Mother         hyperthyroidism?     Colon Cancer Neg Hx     Ovarian Cancer Neg Hx     Breast Cancer Neg Hx  Hypertension Father        Social History     Socioeconomic History    Marital status: Single     Spouse name: Not on file    Number of children: Not on file    Years of education: Not on file    Highest education level: Not on file   Occupational History    Not on file   Tobacco Use    Smoking status: Never    Smokeless tobacco: Never   Substance and Sexual Activity    Alcohol use: Not Currently    Drug use: Never    Sexual activity: Not Currently   Other Topics Concern    Not on file   Social History Narrative    Abuse: Feels safe at home, no history of physical abuse, no history of sexual abuse       Social Determinants of Health     Financial Resource Strain: Low Risk     Difficulty of Paying Living Expenses: Not hard at all   Food Insecurity: No Food Insecurity    Worried About [address] in the Last Year: Never true    [address] in the Last Year: Never true   Transportation Needs: Unknown    Lack of Transportation (Medical): Not on file    Lack of Transportation (Non-Medical): No   Physical Activity: Not on file   Stress: Not on file   Social Connections: Not on file   Intimate Partner Violence: Not on file   Housing Stability: Unknown    Unable to Pay for Housing in the Last Year: Not on file    Number of Places Lived in the Last Year: Not on file    Unstable Housing in the Last Year: No           Objective    Vitals:    02/06/23 1424   BP: 126/74   Site: Left Upper Arm   Position: Sitting   Weight: 184 lb (83.5 kg)   Height: 5' 2\" (1.575 m)       General: well developed, well nourished, in no acute distress    Head: normocephalic and atraumatic    Resp: even and unlabored    Psych: Normal mood and affect        Assessment and Plan      Patient Active Problem List    Diagnosis Date Noted    Multigravida in third trimester 10/10/2022     Priority: Medium     Overview Note:     EDC by LMP confirmed by 9 3/7 week     1/14/2020: CF/SMA negative  11/08/2022:  NIPT negx3, male  12/8/22:  AFP only neg       Assessment & Plan Note:     PTL/labor precautions, [address], and pregnancy warning signs reviewed. Pt advised to call the office at [phone] or go straight to Labor and Delivery at [facility] with any of the following concerns vaginal bleeding, leaking of fluid, jer regularly Q 5-7 minutes for over an hour or not feeling the baby move. RTO 2 weeks OBV, glucola, cbc, thyroid labs, rhogam/tdap      Rh negative state in antepartum period 11/22/2019     Overview Note:     Rhogam at 28 weeks and PP if indicated         Assessment & Plan Note:     noted      Thyroid disease affecting pregnancy 11/20/2019     Overview Note:     11/20/2019: Seen by Dr Narcisa Baker in 2018 for Hashimoto's Thyroiditis not requiring treatment. 10/10/2022: no treatment, labs wnl  12/8/22: TSH 0.573    PLAN:  Labs q trimester - mgmt accordingly, serial growth in 3rd trimester    2/6/2023: EFW 83%, AC 83%, ZEINA nl, transverse           Assessment & Plan Note:     noted      Hashimoto's thyroiditis        Problem List Items Addressed This Visit        Endocrine    Thyroid disease affecting pregnancy     noted         Relevant Orders    AMB POC US OB RE-EVAL/FOLLOW UP (Completed)       Other    Multigravida in third trimester - Primary     PTL/labor precautions, [address], and pregnancy warning signs reviewed. Pt advised to call the office at [phone] or go straight to Labor and Delivery at [facility] with any of the following concerns vaginal bleeding, leaking of fluid, jer regularly Q 5-7 minutes for over an hour or not feeling the baby move.    RTO 2 weeks OBV, glucola, cbc, thyroid labs, rhogam/tdap         Rh negative state in antepartum period     noted        Other Visit Diagnoses     Encounter for follow-up ultrasound of fetal anatomy        Relevant Orders    AMB POC US OB RE-EVAL/FOLLOW UP (Completed)          Orders Placed This Encounter   Procedures    AMB POC US OB RE-EVAL/FOLLOW UP       Outpatient Encounter Medications as of 2/6/2023   Medication Sig Dispense Refill    Prenatal Vit-Fe Fumarate-FA (PRENATAL VITAMIN) 27-0.8 MG TABS Take 1 tablet by mouth daily 90 tablet 3    ondansetron (ZOFRAN) 4 MG tablet Take 1 tablet by mouth 3 times daily as needed for Nausea or Vomiting (Patient not taking: Reported on 2/6/2023) 15 tablet 0     No facility-administered encounter medications on file as of 2/6/2023.                Labor signs, pregnancy warning signs, and fetal movement counting reviewed (if applicable)        Elizabeth Cassidy NP, APRN - CNP 02/06/23 2:55 PM

## 2023-02-06 NOTE — PROGRESS NOTES
Patient comes in today for routine prenatal visit. No complaints/concerns today.      Fetal Movement: Yes  Contractions: No  Vaginal Bleeding: No  Leaking Fluid: No  GI/: Yes    Vitals:    02/06/23 1424   BP: 126/74   Site: Left Upper Arm   Position: Sitting   Weight: 184 lb (83.5 kg)   Height: 5' 2\" (1.575 m)

## 2023-02-10 NOTE — PROGRESS NOTES
I have reviewed the patient's visit today including history, exam and assessment by FATOUMATA Hayes. I agree with treatment/plan as above.     Jens Latham MD  10:08 AM  02/10/23

## 2023-02-20 ENCOUNTER — ROUTINE PRENATAL (OUTPATIENT)
Dept: OBGYN CLINIC | Age: 23
End: 2023-02-20
Payer: MEDICAID

## 2023-02-20 VITALS
WEIGHT: 185 LBS | SYSTOLIC BLOOD PRESSURE: 126 MMHG | HEIGHT: 62 IN | BODY MASS INDEX: 34.04 KG/M2 | DIASTOLIC BLOOD PRESSURE: 84 MMHG

## 2023-02-20 DIAGNOSIS — Z34.83 MULTIGRAVIDA IN THIRD TRIMESTER: ICD-10-CM

## 2023-02-20 DIAGNOSIS — O26.899 RH NEGATIVE STATE IN ANTEPARTUM PERIOD: ICD-10-CM

## 2023-02-20 DIAGNOSIS — Z67.91 RH NEGATIVE STATE IN ANTEPARTUM PERIOD: ICD-10-CM

## 2023-02-20 DIAGNOSIS — Z34.83 MULTIGRAVIDA IN THIRD TRIMESTER: Primary | ICD-10-CM

## 2023-02-20 LAB
ERYTHROCYTE [DISTWIDTH] IN BLOOD BY AUTOMATED COUNT: 14.3 % (ref 11.9–14.6)
HCT VFR BLD AUTO: 34.2 % (ref 35.8–46.3)
HGB BLD-MCNC: 11 G/DL (ref 11.7–15.4)
MCH RBC QN AUTO: 26.6 PG (ref 26.1–32.9)
MCHC RBC AUTO-ENTMCNC: 32.2 G/DL (ref 31.4–35)
MCV RBC AUTO: 82.6 FL (ref 82–102)
NRBC # BLD: 0 K/UL (ref 0–0.2)
PLATELET # BLD AUTO: 154 K/UL (ref 150–450)
PMV BLD AUTO: 12.6 FL (ref 9.4–12.3)
RBC # BLD AUTO: 4.14 M/UL (ref 4.05–5.2)
T4 FREE SERPL-MCNC: 0.9 NG/DL (ref 0.78–1.46)
TSH, 3RD GENERATION: 0.64 UIU/ML (ref 0.36–3.74)
WBC # BLD AUTO: 8.8 K/UL (ref 4.3–11.1)

## 2023-02-20 PROCEDURE — 99213 OFFICE O/P EST LOW 20 MIN: CPT | Performed by: NURSE PRACTITIONER

## 2023-02-20 ASSESSMENT — PATIENT HEALTH QUESTIONNAIRE - PHQ9
2. FEELING DOWN, DEPRESSED OR HOPELESS: 0
SUM OF ALL RESPONSES TO PHQ QUESTIONS 1-9: 0
SUM OF ALL RESPONSES TO PHQ9 QUESTIONS 1 & 2: 0
SUM OF ALL RESPONSES TO PHQ QUESTIONS 1-9: 0
1. LITTLE INTEREST OR PLEASURE IN DOING THINGS: 0

## 2023-02-20 NOTE — PROGRESS NOTES
This is a 23 y.o.  [id number] at 27w6d for routine OB visit. Her Estimated Due Date is 2023, by Last Menstrual Period    Denies leaking of fluid, vaginal bleeding, or regular contractions. Reports fetal movement. Current Outpatient Medications on File Prior to Visit   Medication Sig Dispense Refill    Prenatal Vit-Fe Fumarate-FA (PRENATAL VITAMIN) 27-0.8 MG TABS Take 1 tablet by mouth daily 90 tablet 3    ondansetron (ZOFRAN) 4 MG tablet Take 1 tablet by mouth 3 times daily as needed for Nausea or Vomiting (Patient not taking: No sig reported) 15 tablet 0     No current facility-administered medications on file prior to visit. No Known Allergies    OB History    Para Term  AB Living   4 1 1 0 2 1   SAB IAB Ectopic Molar Multiple Live Births   2 0 0 0 0 1       # 1 - Date: None, Sex: None, Weight: None, GA: None, Delivery: None, Apgar1: None, Apgar5: None, Living: None, Birth Comments: None    # 2 - Date: 2019, Sex: None, Weight: None, GA: 14w0d, Delivery: None, Apgar1: None, Apgar5: None, Living: None, Birth Comments: None    # 3 - Date: 20, Sex: Female, Weight: 6 lb 11.6 oz (3.05 kg), GA: 39w2d, Delivery: Vaginal, Spontaneous, Apgar1: 7, Apgar5: 9, Living: Living, Birth Comments: None    # 4 - Date: None, Sex: None, Weight: None, GA: None, Delivery: None, Apgar1: None, Apgar5: None, Living: None, Birth Comments: None        Past Medical History:   Diagnosis Date    Hashimoto's thyroiditis        Past Surgical History:   Procedure Laterality Date    DILATION AND CURETTAGE      DILATION AND CURETTAGE OF UTERUS  2019       Family History   Problem Relation Age of Onset    Uterine Cancer Neg Hx     Thyroid Disease Paternal Grandmother         hyperthyroidism?  Thyroid Disease Sister         hypothyroidism?  Thyroid Disease Paternal Aunt         hyperthyroidism?  Thyroid Disease Mother         hyperthyroidism?     Colon Cancer Neg Hx     Ovarian Cancer Neg Hx  Breast Cancer Neg Hx     Hypertension Father        Social History     Socioeconomic History    Marital status: Single     Spouse name: Not on file    Number of children: Not on file    Years of education: Not on file    Highest education level: Not on file   Occupational History    Not on file   Tobacco Use    Smoking status: Never    Smokeless tobacco: Never   Substance and Sexual Activity    Alcohol use: Not Currently    Drug use: Never    Sexual activity: Yes     Partners: Male     Comment: With father of child   Other Topics Concern    Not on file   Social History Narrative    Abuse: Feels safe at home, no history of physical abuse, no history of sexual abuse       Social Determinants of Health     Financial Resource Strain: Low Risk     Difficulty of Paying Living Expenses: Not hard at all   Food Insecurity: No Food Insecurity    Worried About [address] in the Last Year: Never true    [address] in the Last Year: Never true   Transportation Needs: Unknown    Lack of Transportation (Medical): Not on file    Lack of Transportation (Non-Medical):  No   Physical Activity: Not on file   Stress: Not on file   Social Connections: Not on file   Intimate Partner Violence: Not on file   Housing Stability: Unknown    Unable to Pay for Housing in the Last Year: Not on file    Number of Places Lived in the Last Year: Not on file    Unstable Housing in the Last Year: No           Objective    Vitals:    02/20/23 1348   BP: 126/84   Site: Left Upper Arm   Position: Sitting   Weight: 185 lb (83.9 kg)   Height: 5' 2\" (1.575 m)       General: well developed, well nourished, in no acute distress    Head: normocephalic and atraumatic    Resp: even and unlabored    Psych: Normal mood and affect        Assessment and Plan      Patient Active Problem List    Diagnosis Date Noted    Multigravida in third trimester 10/10/2022     Priority: Medium     Overview Note:     EDC by LMP confirmed by 9 3/7 week US    1/14/2020: CF/SMA negative  11/08/2022: NIPT negx3, male  12/8/22:  AFP only neg       Assessment & Plan Note:     PTL/labor precautions, [address], and pregnancy warning signs reviewed. Pt advised to call the office at [phone] or go straight to Labor and Delivery at [facility] with any of the following concerns vaginal bleeding, leaking of fluid, jer regularly Q 5-7 minutes for over an hour or not feeling the baby move. RTO 2 weeks OBV, US, thogam/tdap      Rh negative state in antepartum period 11/22/2019     Overview Note:     Rhogam at 28 weeks and PP if indicated         Assessment & Plan Note:     Out of stock today, will give with next visit      Thyroid disease affecting pregnancy 11/20/2019     Overview Note:     11/20/2019: Seen by Dr Brionna Potts in 2018 for Hashimoto's Thyroiditis not requiring treatment. 10/10/2022: no treatment, labs wnl  12/8/22: TSH 0.573    PLAN:  Labs q trimester - mgmt accordingly, serial growth in 3rd trimester    2/6/2023: EFW 83%, AC 83%, ZEINA nl, transverse          Hashimoto's thyroiditis        Problem List Items Addressed This Visit        Other    Multigravida in third trimester - Primary     PTL/labor precautions, [address], and pregnancy warning signs reviewed. Pt advised to call the office at [phone] or go straight to Labor and Delivery at [facility] with any of the following concerns vaginal bleeding, leaking of fluid, jer regularly Q 5-7 minutes for over an hour or not feeling the baby move.    RTO 2 weeks OBV, US, thogam/tdap         Relevant Orders    Glucose tolerance, 1 hour    CBC    TSH    T4, Free    Rh negative state in antepartum period     Out of stock today, will give with next visit            Orders Placed This Encounter   Procedures    Glucose tolerance, 1 hour    CBC    TSH    T4, Free       Outpatient Encounter Medications as of 2/20/2023   Medication Sig Dispense Refill    Prenatal Vit-Fe Fumarate-FA (PRENATAL VITAMIN) 27-0.8 MG TABS Take 1 tablet by mouth daily 90 tablet 3    ondansetron (ZOFRAN) 4 MG tablet Take 1 tablet by mouth 3 times daily as needed for Nausea or Vomiting (Patient not taking: No sig reported) 15 tablet 0     No facility-administered encounter medications on file as of 2/20/2023.                Labor signs, pregnancy warning signs, and fetal movement counting reviewed (if applicable)        Jani Iniguez NP, APRN - CNP 02/20/23 2:02 PM

## 2023-02-20 NOTE — ASSESSMENT & PLAN NOTE
PTL/labor precautions, [address], and pregnancy warning signs reviewed. Pt advised to call the office at [phone] or go straight to Labor and Delivery at [facility] with any of the following concerns vaginal bleeding, leaking of fluid, jer regularly Q 5-7 minutes for over an hour or not feeling the baby move.    RTO 2 weeks OBV, US, thogam/tdap

## 2023-02-20 NOTE — PROGRESS NOTES
Patient comes in today for routine prenatal visit. No complaints/concerns today.      Finished Glucola: 1:53pm     Fetal Movement: Yes  Contractions: No BH  Vaginal Bleeding: No  Leaking Fluid: No  GI/: No    Vitals:    02/20/23 1348   BP: 126/84   Site: Left Upper Arm   Position: Sitting   Weight: 185 lb (83.9 kg)   Height: 5' 2\" (1.575 m)

## 2023-02-21 DIAGNOSIS — O99.280 THYROID DISEASE AFFECTING PREGNANCY: ICD-10-CM

## 2023-02-21 DIAGNOSIS — O99.013 ANEMIA AFFECTING PREGNANCY IN THIRD TRIMESTER: ICD-10-CM

## 2023-02-21 DIAGNOSIS — E07.9 THYROID DISEASE AFFECTING PREGNANCY: ICD-10-CM

## 2023-02-21 LAB — GLUCOSE 1 HOUR: 115 MG/DL

## 2023-02-21 RX ORDER — FERROUS SULFATE 325(65) MG
325 TABLET ORAL 2 TIMES DAILY
Qty: 60 TABLET | Refills: 5 | Status: SHIPPED | OUTPATIENT
Start: 2023-02-21

## 2023-02-21 NOTE — TELEPHONE ENCOUNTER
Please call patient and let her know that her hemoglobin was low. She needs to start taking FeSO4 325mg PO BID. Also encourage foods that are high in iron. Patient can take Colace 100mg PO BID prn for constipation. Encourage an increase in fluids and foods high in fiber to prevent constipation.

## 2023-02-24 NOTE — PROGRESS NOTES
I have reviewed the patient's visit today including history, exam and assessment by FATOUMATA Walker. I agree with treatment/plan as above.     Baldemar Gregorio MD  10:08 AM  02/24/23

## 2023-03-07 ENCOUNTER — ROUTINE PRENATAL (OUTPATIENT)
Dept: OBGYN CLINIC | Age: 23
End: 2023-03-07
Payer: MEDICAID

## 2023-03-07 VITALS
BODY MASS INDEX: 34.23 KG/M2 | DIASTOLIC BLOOD PRESSURE: 74 MMHG | HEIGHT: 62 IN | WEIGHT: 186 LBS | SYSTOLIC BLOOD PRESSURE: 130 MMHG

## 2023-03-07 DIAGNOSIS — Z34.83 MULTIGRAVIDA IN THIRD TRIMESTER: ICD-10-CM

## 2023-03-07 DIAGNOSIS — O99.013 ANEMIA AFFECTING PREGNANCY IN THIRD TRIMESTER: ICD-10-CM

## 2023-03-07 DIAGNOSIS — O26.899 RH NEGATIVE STATE IN ANTEPARTUM PERIOD: ICD-10-CM

## 2023-03-07 DIAGNOSIS — Z67.91 RH NEGATIVE STATE IN ANTEPARTUM PERIOD: ICD-10-CM

## 2023-03-07 DIAGNOSIS — O99.280 THYROID DISEASE AFFECTING PREGNANCY: Primary | ICD-10-CM

## 2023-03-07 DIAGNOSIS — E07.9 THYROID DISEASE AFFECTING PREGNANCY: Primary | ICD-10-CM

## 2023-03-07 PROCEDURE — 76816 OB US FOLLOW-UP PER FETUS: CPT | Performed by: NURSE PRACTITIONER

## 2023-03-07 PROCEDURE — 99213 OFFICE O/P EST LOW 20 MIN: CPT | Performed by: NURSE PRACTITIONER

## 2023-03-07 ASSESSMENT — PATIENT HEALTH QUESTIONNAIRE - PHQ9
SUM OF ALL RESPONSES TO PHQ9 QUESTIONS 1 & 2: 0
2. FEELING DOWN, DEPRESSED OR HOPELESS: 0
1. LITTLE INTEREST OR PLEASURE IN DOING THINGS: 0
SUM OF ALL RESPONSES TO PHQ QUESTIONS 1-9: 0

## 2023-03-07 NOTE — PROGRESS NOTES
Patient comes in today for routine prenatal visit. No complaints/concerns today. Rhogam consent signed. Rhogam given. Patient states wanting Tdap with next visit.      Fetal Movement: Yes  Contractions: No  Vaginal Bleeding: No  Leaking Fluid: No  GI/: No    Vitals:    03/07/23 1510   BP: 130/74   Site: Right Upper Arm   Position: Sitting   Weight: 186 lb (84.4 kg)   Height: 5' 2\" (1.575 m)

## 2023-03-07 NOTE — PROGRESS NOTES
This is a 20 y.o.  [id number] at 30w0d for routine OB visit. Her Estimated Due Date is 2023, by Last Menstrual Period    Denies leaking of fluid, vaginal bleeding, or regular contractions. Reports fetal movement. Current Outpatient Medications on File Prior to Visit   Medication Sig Dispense Refill    ferrous sulfate (IRON 325) 325 (65 Fe) MG tablet Take 1 tablet by mouth 2 times daily 60 tablet 5    Prenatal Vit-Fe Fumarate-FA (PRENATAL VITAMIN) 27-0.8 MG TABS Take 1 tablet by mouth daily 90 tablet 3    ondansetron (ZOFRAN) 4 MG tablet Take 1 tablet by mouth 3 times daily as needed for Nausea or Vomiting (Patient not taking: No sig reported) 15 tablet 0     No current facility-administered medications on file prior to visit. No Known Allergies    OB History    Para Term  AB Living   4 1 1 0 2 1   SAB IAB Ectopic Molar Multiple Live Births   2 0 0 0 0 1       # 1 - Date: None, Sex: None, Weight: None, GA: None, Delivery: None, Apgar1: None, Apgar5: None, Living: None, Birth Comments: None    # 2 - Date: 2019, Sex: None, Weight: None, GA: 14w0d, Delivery: None, Apgar1: None, Apgar5: None, Living: None, Birth Comments: None    # 3 - Date: 20, Sex: Female, Weight: 6 lb 11.6 oz (3.05 kg), GA: 39w2d, Delivery: Vaginal, Spontaneous, Apgar1: 7, Apgar5: 9, Living: Living, Birth Comments: None    # 4 - Date: None, Sex: None, Weight: None, GA: None, Delivery: None, Apgar1: None, Apgar5: None, Living: None, Birth Comments: None        Past Medical History:   Diagnosis Date    Anemia affecting pregnancy in third trimester 2023    Hashimoto's thyroiditis        Past Surgical History:   Procedure Laterality Date    DILATION AND CURETTAGE      DILATION AND CURETTAGE OF UTERUS  2019       Family History   Problem Relation Age of Onset    Uterine Cancer Neg Hx     Thyroid Disease Paternal Grandmother         hyperthyroidism?  Thyroid Disease Sister         hypothyroidism?  Thyroid Disease Paternal Aunt         hyperthyroidism?  Thyroid Disease Mother         hyperthyroidism?  Colon Cancer Neg Hx     Ovarian Cancer Neg Hx     Breast Cancer Neg Hx     Hypertension Father        Social History     Socioeconomic History    Marital status: Single     Spouse name: Not on file    Number of children: Not on file    Years of education: Not on file    Highest education level: Not on file   Occupational History    Not on file   Tobacco Use    Smoking status: Never    Smokeless tobacco: Never   Substance and Sexual Activity    Alcohol use: Not Currently    Drug use: Never    Sexual activity: Yes     Partners: Male     Comment: With father of child   Other Topics Concern    Not on file   Social History Narrative    Abuse: Feels safe at home, no history of physical abuse, no history of sexual abuse       Social Determinants of Health     Financial Resource Strain: Low Risk     Difficulty of Paying Living Expenses: Not hard at all   Food Insecurity: No Food Insecurity    Worried About [address] in the Last Year: Never true    [address] in the Last Year: Never true   Transportation Needs: Unknown    Lack of Transportation (Medical): Not on file    Lack of Transportation (Non-Medical):  No   Physical Activity: Not on file   Stress: Not on file   Social Connections: Not on file   Intimate Partner Violence: Not on file   Housing Stability: Unknown    Unable to Pay for Housing in the Last Year: Not on file    Number of Places Lived in the Last Year: Not on file    Unstable Housing in the Last Year: No           Objective    Vitals:    03/07/23 1510   BP: 130/74   Site: Right Upper Arm   Position: Sitting   Weight: 186 lb (84.4 kg)   Height: 5' 2\" (1.575 m)       General: well developed, well nourished, in no acute distress    Head: normocephalic and atraumatic    Resp: even and unlabored    Psych: Normal mood and affect        Assessment and Plan      Patient Active Problem List    Diagnosis Date Noted    Anemia affecting pregnancy in third trimester 02/21/2023     Priority: Medium     Overview Note:     Add'l iron       Assessment & Plan Note:     noted      Multigravida in third trimester 10/10/2022     Priority: Medium     Overview Note:     EDC by LMP confirmed by 9 3/7 week US    1/14/2020: CF/SMA negative  11/08/2022: NIPT negx3, male  12/8/22:  AFP only neg       Assessment & Plan Note:     PTL/labor precautions, [facility], and pregnancy warning signs reviewed. Pt advised to call the office at [phone] or go straight to Labor and Delivery at [facility] with any of the following concerns vaginal bleeding, leaking of fluid, jer regularly Q 5-7 minutes for over an hour or not feeling the baby move. RTO 2 weeeks OBV, tdap (declines tdap today would like with next visit)      Rh negative state in antepartum period 11/22/2019     Overview Note:     Rhogam at 28 weeks (given 3/7/2023)  and PP if indicated        Thyroid disease affecting pregnancy 11/20/2019     Overview Note:     11/20/2019: Seen by Dr Ulysses Overly in 2018 for Hashimoto's Thyroiditis not requiring treatment. 10/10/2022: no treatment, labs wnl  12/8/22: TSH 0.573    PLAN:  Labs q trimester - mgmt accordingly, serial growth in 3rd trimester    2/6/2023: EFW 83%, AC 83%, ZEINA nl, transverse  2/20/2023: TSH 0.628, T4 0.9 normal  3/7/2023: EFW 92%, AC 85%, ZEINA nl, vertex. We discuss limiting carbs          Hashimoto's thyroiditis        Problem List Items Addressed This Visit        Endocrine    Thyroid disease affecting pregnancy - Primary    Relevant Orders    AMB POC US OB RE-EVAL/FOLLOW UP (Completed)       Other    Anemia affecting pregnancy in third trimester     noted         Relevant Orders    AMB POC US OB RE-EVAL/FOLLOW UP (Completed)    Multigravida in third trimester     PTL/labor precautions, [facility], and pregnancy warning signs reviewed.  Pt advised to call the office at [phone] or go straight to Labor and Delivery at [facility] with any of the following concerns vaginal bleeding, leaking of fluid, jer regularly Q 5-7 minutes for over an hour or not feeling the baby move. RTO 2 weeeks OBV, tdap (declines tdap today would like with next visit)         Relevant Orders    AMB POC US OB RE-EVAL/FOLLOW UP (Completed)    Rh negative state in antepartum period       Orders Placed This Encounter   Procedures    AMB POC US OB RE-EVAL/FOLLOW UP       Outpatient Encounter Medications as of 3/7/2023   Medication Sig Dispense Refill    ferrous sulfate (IRON 325) 325 (65 Fe) MG tablet Take 1 tablet by mouth 2 times daily 60 tablet 5    Prenatal Vit-Fe Fumarate-FA (PRENATAL VITAMIN) 27-0.8 MG TABS Take 1 tablet by mouth daily 90 tablet 3    ondansetron (ZOFRAN) 4 MG tablet Take 1 tablet by mouth 3 times daily as needed for Nausea or Vomiting (Patient not taking: No sig reported) 15 tablet 0    [] rho(D) immune globulin (HYPERRHO S/D) injection 300 mcg        No facility-administered encounter medications on file as of 3/7/2023.                Labor signs, pregnancy warning signs, and fetal movement counting reviewed (if applicable)        SUSHANT Bell CNP 23 3:36 PM

## 2023-03-07 NOTE — ASSESSMENT & PLAN NOTE
PTL/labor precautions, [address], and pregnancy warning signs reviewed. Pt advised to call the office at [phone] or go straight to Labor and Delivery at [facility] with any of the following concerns vaginal bleeding, leaking of fluid, jer regularly Q 5-7 minutes for over an hour or not feeling the baby move.    RTO 2 weeeks OBV, tdap (declines tdap today would like with next visit)

## 2023-03-09 NOTE — PROGRESS NOTES
I have reviewed the patient's visit today including history, exam and assessment by FATOUMATA Kennedy. I agree with treatment/plan as above.     Paco Bond MD  1:08 PM  03/09/23

## 2023-03-21 ENCOUNTER — ROUTINE PRENATAL (OUTPATIENT)
Dept: OBGYN CLINIC | Age: 23
End: 2023-03-21
Payer: MEDICAID

## 2023-03-21 VITALS
HEIGHT: 62 IN | WEIGHT: 186 LBS | BODY MASS INDEX: 34.23 KG/M2 | SYSTOLIC BLOOD PRESSURE: 130 MMHG | DIASTOLIC BLOOD PRESSURE: 72 MMHG

## 2023-03-21 DIAGNOSIS — O99.013 ANEMIA AFFECTING PREGNANCY IN THIRD TRIMESTER: ICD-10-CM

## 2023-03-21 DIAGNOSIS — Z67.91 RH NEGATIVE STATE IN ANTEPARTUM PERIOD: ICD-10-CM

## 2023-03-21 DIAGNOSIS — O26.899 RH NEGATIVE STATE IN ANTEPARTUM PERIOD: ICD-10-CM

## 2023-03-21 DIAGNOSIS — O99.280 THYROID DISEASE AFFECTING PREGNANCY: ICD-10-CM

## 2023-03-21 DIAGNOSIS — E07.9 THYROID DISEASE AFFECTING PREGNANCY: ICD-10-CM

## 2023-03-21 DIAGNOSIS — Z34.83 MULTIGRAVIDA IN THIRD TRIMESTER: Primary | ICD-10-CM

## 2023-03-21 PROCEDURE — 90715 TDAP VACCINE 7 YRS/> IM: CPT | Performed by: NURSE PRACTITIONER

## 2023-03-21 PROCEDURE — 90471 IMMUNIZATION ADMIN: CPT | Performed by: NURSE PRACTITIONER

## 2023-03-21 PROCEDURE — 99213 OFFICE O/P EST LOW 20 MIN: CPT | Performed by: NURSE PRACTITIONER

## 2023-03-21 ASSESSMENT — PATIENT HEALTH QUESTIONNAIRE - PHQ9
SUM OF ALL RESPONSES TO PHQ QUESTIONS 1-9: 0
SUM OF ALL RESPONSES TO PHQ9 QUESTIONS 1 & 2: 0
1. LITTLE INTEREST OR PLEASURE IN DOING THINGS: 0
SUM OF ALL RESPONSES TO PHQ QUESTIONS 1-9: 0
2. FEELING DOWN, DEPRESSED OR HOPELESS: 0
SUM OF ALL RESPONSES TO PHQ QUESTIONS 1-9: 0
SUM OF ALL RESPONSES TO PHQ QUESTIONS 1-9: 0

## 2023-03-21 NOTE — ASSESSMENT & PLAN NOTE
PTL/labor precautions, [address], and pregnancy warning signs reviewed. Pt advised to call the office at [phone] or go straight to Labor and Delivery at [facility] with any of the following concerns vaginal bleeding, leaking of fluid, jer regularly Q 5-7 minutes for over an hour or not feeling the baby move.    RTO 2 weeks OBV

## 2023-03-22 NOTE — PROGRESS NOTES
I have reviewed the patient's visit today including history, exam and assessment by FATOUMATA Simon. I agree with treatment/plan as above.     Juan Diego Granado MD  11:39 AM  03/22/23
Patient comes in today for routine prenatal visit. No complaints/concerns today. Tdap VIS given, patient signed consent form.      Fetal Movement: Yes  Contractions: No  Vaginal Bleeding: No  Leaking Fluid: No  GI/: No    Vitals:    03/21/23 1518   BP: 130/72   Site: Right Upper Arm   Position: Sitting   Weight: 186 lb (84.4 kg)   Height: 5' 2\" (1.575 m)
[facility] with any of the following concerns vaginal bleeding, leaking of fluid, jer regularly Q 5-7 minutes for over an hour or not feeling the baby move. RTO 2 weeks OBV         Relevant Orders    AR IM ADM PRQ ID SUBQ/IM NJXS 1 VACCINE    Tdap, BOOSTRIX, (age 8 yrs+), IM (Completed)    Rh negative state in antepartum period     noted            Orders Placed This Encounter   Procedures    Tdap, BOOSTRIX, (age 8 yrs+), IM    AR IM ADM PRQ ID SUBQ/IM NJXS 1 VACCINE       Outpatient Encounter Medications as of 3/21/2023   Medication Sig Dispense Refill    ferrous sulfate (IRON 325) 325 (65 Fe) MG tablet Take 1 tablet by mouth 2 times daily 60 tablet 5    Prenatal Vit-Fe Fumarate-FA (PRENATAL VITAMIN) 27-0.8 MG TABS Take 1 tablet by mouth daily 90 tablet 3    ondansetron (ZOFRAN) 4 MG tablet Take 1 tablet by mouth 3 times daily as needed for Nausea or Vomiting (Patient not taking: No sig reported) 15 tablet 0     No facility-administered encounter medications on file as of 3/21/2023.                Labor signs, pregnancy warning signs, and fetal movement counting reviewed (if applicable)        SUSHANT Fairchild CNP 03/21/23 3:37 PM

## 2023-04-04 ENCOUNTER — ROUTINE PRENATAL (OUTPATIENT)
Dept: OBGYN CLINIC | Age: 23
End: 2023-04-04
Payer: MEDICAID

## 2023-04-04 VITALS
DIASTOLIC BLOOD PRESSURE: 76 MMHG | HEIGHT: 62 IN | WEIGHT: 187 LBS | BODY MASS INDEX: 34.41 KG/M2 | SYSTOLIC BLOOD PRESSURE: 132 MMHG

## 2023-04-04 DIAGNOSIS — O26.843 UTERINE SIZE DATE DISCREPANCY PREGNANCY, THIRD TRIMESTER: Primary | ICD-10-CM

## 2023-04-04 DIAGNOSIS — O99.013 ANEMIA AFFECTING PREGNANCY IN THIRD TRIMESTER: ICD-10-CM

## 2023-04-04 DIAGNOSIS — Z67.91 RH NEGATIVE STATE IN ANTEPARTUM PERIOD: ICD-10-CM

## 2023-04-04 DIAGNOSIS — O26.843 SIZE OF FETUS INCONSISTENT WITH DATES IN THIRD TRIMESTER: ICD-10-CM

## 2023-04-04 DIAGNOSIS — E07.9 THYROID DISEASE AFFECTING PREGNANCY: ICD-10-CM

## 2023-04-04 DIAGNOSIS — O40.9XX0 AFI (AMNIOTIC FLUID INDEX) INCREASED: ICD-10-CM

## 2023-04-04 DIAGNOSIS — Z34.83 MULTIGRAVIDA IN THIRD TRIMESTER: ICD-10-CM

## 2023-04-04 DIAGNOSIS — O99.280 THYROID DISEASE AFFECTING PREGNANCY: ICD-10-CM

## 2023-04-04 DIAGNOSIS — O26.899 RH NEGATIVE STATE IN ANTEPARTUM PERIOD: ICD-10-CM

## 2023-04-04 PROCEDURE — 99213 OFFICE O/P EST LOW 20 MIN: CPT | Performed by: OBSTETRICS & GYNECOLOGY

## 2023-04-04 PROCEDURE — 76816 OB US FOLLOW-UP PER FETUS: CPT | Performed by: OBSTETRICS & GYNECOLOGY

## 2023-04-04 ASSESSMENT — PATIENT HEALTH QUESTIONNAIRE - PHQ9
SUM OF ALL RESPONSES TO PHQ9 QUESTIONS 1 & 2: 0
SUM OF ALL RESPONSES TO PHQ QUESTIONS 1-9: 0
1. LITTLE INTEREST OR PLEASURE IN DOING THINGS: 0
SUM OF ALL RESPONSES TO PHQ QUESTIONS 1-9: 0
2. FEELING DOWN, DEPRESSED OR HOPELESS: 0

## 2023-04-04 NOTE — PROGRESS NOTES
Patient comes in today for routine prenatal visit. No complaints/concerns today.      Fetal Movement: Yes  Contractions: Yes-BH  Vaginal Bleeding: No  Leaking Fluid: No  GI/: No    Vitals:    04/04/23 1517   BP: 132/76   Site: Left Upper Arm   Position: Sitting   Weight: 187 lb (84.8 kg)   Height: 5' 2\" (1.575 m)

## 2023-04-04 NOTE — ASSESSMENT & PLAN NOTE
D/W pt to attempt to limit carbohydrate intake for the remainder of pregnancy to help reduce potential excessive growth and/or ZEINA. D/W her that this includes any with sugar in it (sweets, desserts, etc.), breads, potatoes, rice and pasta.

## 2023-04-04 NOTE — PROGRESS NOTES
Chief Complaint   Patient presents with    Routine Prenatal Visit    Ultrasound        This 20 y.o. [id number] at 34w0d with Estimated Date of Delivery: 23 presents for routine prenatal visit. Patient has no complaints today. Pt reports good FM, no LOF, VB, ctx. Pt denies H/A, vision changes, abdom pain, N/V. Vitals:    23 1517   BP: 132/76   Site: Left Upper Arm   Position: Sitting   Weight: 187 lb (84.8 kg)   Height: 5' 2\" (1.575 m)        Patient Active Problem List    Diagnosis Date Noted    Anemia affecting pregnancy in third trimester 2023     Priority: Medium     Overview Note:     Add'l iron       Assessment & Plan Note:     noted      Multigravida in third trimester 10/10/2022     Overview Note:     EDC by LMP confirmed by 9 3/7 week US    2020: CF/SMA negative  2022: NIPT negx3, male  22:  AFP only neg  3/21/2023: tdap given  :  EFW 84%, AC 72%, ZEINA 23.9 cm, vtx         Assessment & Plan Note:     Educated patient of signs and symptoms of  labor including but not limited to regular uterine contractions every 5-7 minutes for 1 hour, vaginal bleeding or leakage of fluid to seek immediate care.  Rh negative state in antepartum period 2019     Overview Note:     Rhogam at 28 weeks (given 3/7/2023)  and PP if indicated         Assessment & Plan Note:     noted      Thyroid disease affecting pregnancy 2019     Overview Note:     2019: Seen by Dr Aurelio Salinas in 2018 for Hashimoto's Thyroiditis not requiring treatment. 10/10/2022: no treatment, labs wnl  22: TSH 0.573    PLAN:  Labs q trimester - mgmt accordingly, serial growth in 3rd trimester    2023: EFW 83%, AC 83%, ZEINA nl, transverse  2023: TSH 0.628, T4 0.9 normal  3/7/2023: EFW 92%, AC 85%, ZEINA nl, vertex.  We discuss limiting carbs  :  EFW 84%, AC 72%, ZEINA 23.9 cm, vtx           Assessment & Plan Note:     noted      Hashimoto's thyroiditis       Problem List Items

## 2023-04-19 ENCOUNTER — ROUTINE PRENATAL (OUTPATIENT)
Dept: OBGYN CLINIC | Age: 23
End: 2023-04-19
Payer: MEDICAID

## 2023-04-19 VITALS
SYSTOLIC BLOOD PRESSURE: 128 MMHG | WEIGHT: 187 LBS | DIASTOLIC BLOOD PRESSURE: 86 MMHG | HEIGHT: 62 IN | BODY MASS INDEX: 34.41 KG/M2

## 2023-04-19 DIAGNOSIS — O99.280 THYROID DISEASE AFFECTING PREGNANCY: ICD-10-CM

## 2023-04-19 DIAGNOSIS — Z67.91 RH NEGATIVE STATE IN ANTEPARTUM PERIOD: ICD-10-CM

## 2023-04-19 DIAGNOSIS — O40.3XX0 POLYHYDRAMNIOS AFFECTING PREGNANCY IN THIRD TRIMESTER: Primary | ICD-10-CM

## 2023-04-19 DIAGNOSIS — Z34.83 MULTIGRAVIDA IN THIRD TRIMESTER: ICD-10-CM

## 2023-04-19 DIAGNOSIS — O26.899 RH NEGATIVE STATE IN ANTEPARTUM PERIOD: ICD-10-CM

## 2023-04-19 DIAGNOSIS — O26.843 SIZE OF FETUS INCONSISTENT WITH DATES IN THIRD TRIMESTER: ICD-10-CM

## 2023-04-19 DIAGNOSIS — O99.013 ANEMIA AFFECTING PREGNANCY IN THIRD TRIMESTER: ICD-10-CM

## 2023-04-19 DIAGNOSIS — O26.843 UTERINE SIZE DATE DISCREPANCY PREGNANCY, THIRD TRIMESTER: ICD-10-CM

## 2023-04-19 DIAGNOSIS — E07.9 THYROID DISEASE AFFECTING PREGNANCY: ICD-10-CM

## 2023-04-19 LAB
ERYTHROCYTE [DISTWIDTH] IN BLOOD BY AUTOMATED COUNT: 20.5 % (ref 11.9–14.6)
HCT VFR BLD AUTO: 35.5 % (ref 35.8–46.3)
HGB BLD-MCNC: 11.7 G/DL (ref 11.7–15.4)
MCH RBC QN AUTO: 29.2 PG (ref 26.1–32.9)
MCHC RBC AUTO-ENTMCNC: 33 G/DL (ref 31.4–35)
MCV RBC AUTO: 88.5 FL (ref 82–102)
NRBC # BLD: 0 K/UL (ref 0–0.2)
PLATELET # BLD AUTO: 123 K/UL (ref 150–450)
PMV BLD AUTO: 12.3 FL (ref 9.4–12.3)
RBC # BLD AUTO: 4.01 M/UL (ref 4.05–5.2)
WBC # BLD AUTO: 7.5 K/UL (ref 4.3–11.1)

## 2023-04-19 PROCEDURE — 99213 OFFICE O/P EST LOW 20 MIN: CPT | Performed by: NURSE PRACTITIONER

## 2023-04-19 PROCEDURE — 76819 FETAL BIOPHYS PROFIL W/O NST: CPT | Performed by: NURSE PRACTITIONER

## 2023-04-19 ASSESSMENT — PATIENT HEALTH QUESTIONNAIRE - PHQ9
1. LITTLE INTEREST OR PLEASURE IN DOING THINGS: 0
2. FEELING DOWN, DEPRESSED OR HOPELESS: 0
SUM OF ALL RESPONSES TO PHQ QUESTIONS 1-9: 0
SUM OF ALL RESPONSES TO PHQ9 QUESTIONS 1 & 2: 0

## 2023-04-19 NOTE — ASSESSMENT & PLAN NOTE
PTL/labor precautions, [address], and pregnancy warning signs reviewed. Pt advised to call the office at [phone] or go straight to Labor and Delivery at [facility] with any of the following concerns vaginal bleeding, leaking of fluid, jer regularly Q 5-7 minutes for over an hour or not feeling the baby move.    RTO 1 wk OBV/US

## 2023-04-19 NOTE — PROGRESS NOTES
Patient comes in today for routine prenatal visit. No complaints/concerns today.      Fetal Movement: Yes  Contractions: Yes not consistent   Vaginal Bleeding: No  Leaking Fluid: No  GI/: No    Vitals:    04/19/23 1358   BP: 128/86   Site: Left Upper Arm   Position: Sitting   Weight: 187 lb (84.8 kg)   Height: 5' 2\" (1.575 m)
pregnancy, third trimester        Relevant Orders    AMB POC US FETAL BIOPHYSICAL PROFILE W/O NON STRESS TESTING (Completed)            Orders Placed This Encounter   Procedures    Culture, Strep B Screen, Vaginal/Rectal    AMB POC US FETAL BIOPHYSICAL PROFILE W/O NON STRESS TESTING    CBC    TSH with Reflex       Outpatient Encounter Medications as of 4/19/2023   Medication Sig Dispense Refill    ferrous sulfate (IRON 325) 325 (65 Fe) MG tablet Take 1 tablet by mouth 2 times daily 60 tablet 5    Prenatal Vit-Fe Fumarate-FA (PRENATAL VITAMIN) 27-0.8 MG TABS Take 1 tablet by mouth daily 90 tablet 3    ondansetron (ZOFRAN) 4 MG tablet Take 1 tablet by mouth 3 times daily as needed for Nausea or Vomiting (Patient not taking: Reported on 2/6/2023) 15 tablet 0     No facility-administered encounter medications on file as of 4/19/2023.                Labor signs, pregnancy warning signs, and fetal movement counting reviewed (if applicable)        SUSHANT Beasley CNP 04/19/23 2:27 PM

## 2023-04-20 LAB — TSH W FREE THYROID IF ABNORMAL: 0.88 UIU/ML (ref 0.36–3.74)

## 2023-04-23 ENCOUNTER — HOSPITAL ENCOUNTER (INPATIENT)
Age: 23
LOS: 2 days | Discharge: HOME | DRG: 560 | End: 2023-04-25
Attending: OBSTETRICS & GYNECOLOGY | Admitting: OBSTETRICS & GYNECOLOGY
Payer: MEDICAID

## 2023-04-23 ENCOUNTER — ANESTHESIA (OUTPATIENT)
Dept: LABOR AND DELIVERY | Age: 23
DRG: 560 | End: 2023-04-23
Payer: MEDICAID

## 2023-04-23 ENCOUNTER — ANESTHESIA EVENT (OUTPATIENT)
Dept: LABOR AND DELIVERY | Age: 23
DRG: 560 | End: 2023-04-23
Payer: MEDICAID

## 2023-04-23 PROBLEM — O42.90 PROM (PREMATURE RUPTURE OF MEMBRANES): Status: ACTIVE | Noted: 2023-04-23

## 2023-04-23 LAB
ABO + RH BLD: NORMAL
BACTERIA SPEC CULT: NORMAL
BASE DEFICIT BLD-SCNC: 2.2 MMOL/L
BASE DEFICIT BLD-SCNC: 5.8 MMOL/L
BLOOD GROUP ANTIBODIES SERPL: NORMAL
ERYTHROCYTE [DISTWIDTH] IN BLOOD BY AUTOMATED COUNT: 20.7 % (ref 11.9–14.6)
HCO3 BLD-SCNC: 22.9 MMOL/L (ref 22–26)
HCO3 BLDV-SCNC: 23.3 MMOL/L (ref 23–28)
HCT VFR BLD AUTO: 36.3 % (ref 35.8–46.3)
HGB BLD-MCNC: 11.8 G/DL (ref 11.7–15.4)
MCH RBC QN AUTO: 28.3 PG (ref 26.1–32.9)
MCHC RBC AUTO-ENTMCNC: 32.5 G/DL (ref 31.4–35)
MCV RBC AUTO: 87.1 FL (ref 82–102)
NRBC # BLD: 0 K/UL (ref 0–0.2)
PCO2 BLDCO: 42 MMHG (ref 32–68)
PCO2 BLDCO: 56 MMHG (ref 32–68)
PH BLDCO: 7.22 (ref 7.15–7.38)
PH BLDCO: 7.36 (ref 7.15–7.38)
PLATELET # BLD AUTO: 132 K/UL (ref 150–450)
PMV BLD AUTO: 12.4 FL (ref 9.4–12.3)
PO2 BLDCO: 20 MMHG
PO2 BLDCO: 37 MMHG
RBC # BLD AUTO: 4.17 M/UL (ref 4.05–5.2)
SAO2 % BLD: 23.8 % (ref 95–98)
SAO2 % BLDV: 67.2 % (ref 65–88)
SERVICE CMNT-IMP: ABNORMAL
SERVICE CMNT-IMP: NORMAL
SERVICE CMNT-IMP: NORMAL
SPECIMEN EXP DATE BLD: NORMAL
SPECIMEN TYPE: ABNORMAL
SPECIMEN TYPE: NORMAL
WBC # BLD AUTO: 8.3 K/UL (ref 4.3–11.1)

## 2023-04-23 PROCEDURE — 2580000003 HC RX 258: Performed by: OBSTETRICS & GYNECOLOGY

## 2023-04-23 PROCEDURE — 6360000002 HC RX W HCPCS: Performed by: NURSE ANESTHETIST, CERTIFIED REGISTERED

## 2023-04-23 PROCEDURE — 59409 OBSTETRICAL CARE: CPT | Performed by: OBSTETRICS & GYNECOLOGY

## 2023-04-23 PROCEDURE — 00HU33Z INSERTION OF INFUSION DEVICE INTO SPINAL CANAL, PERCUTANEOUS APPROACH: ICD-10-PCS | Performed by: OBSTETRICS & GYNECOLOGY

## 2023-04-23 PROCEDURE — 7100000010 HC PHASE II RECOVERY - FIRST 15 MIN

## 2023-04-23 PROCEDURE — 0HQ9XZZ REPAIR PERINEUM SKIN, EXTERNAL APPROACH: ICD-10-PCS | Performed by: OBSTETRICS & GYNECOLOGY

## 2023-04-23 PROCEDURE — 85027 COMPLETE CBC AUTOMATED: CPT

## 2023-04-23 PROCEDURE — 82803 BLOOD GASES ANY COMBINATION: CPT

## 2023-04-23 PROCEDURE — 2500000003 HC RX 250 WO HCPCS: Performed by: NURSE ANESTHETIST, CERTIFIED REGISTERED

## 2023-04-23 PROCEDURE — 86900 BLOOD TYPING SEROLOGIC ABO: CPT

## 2023-04-23 PROCEDURE — 36600 WITHDRAWAL OF ARTERIAL BLOOD: CPT

## 2023-04-23 PROCEDURE — 7210000100 HC LABOR FEE PER 1 HR

## 2023-04-23 PROCEDURE — 99283 EMERGENCY DEPT VISIT LOW MDM: CPT

## 2023-04-23 PROCEDURE — 6360000002 HC RX W HCPCS: Performed by: OBSTETRICS & GYNECOLOGY

## 2023-04-23 PROCEDURE — 1100000000 HC RM PRIVATE

## 2023-04-23 PROCEDURE — 86850 RBC ANTIBODY SCREEN: CPT

## 2023-04-23 PROCEDURE — 7220000101 HC DELIVERY VAGINAL/SINGLE

## 2023-04-23 PROCEDURE — 6370000000 HC RX 637 (ALT 250 FOR IP): Performed by: OBSTETRICS & GYNECOLOGY

## 2023-04-23 PROCEDURE — 62327 NJX INTERLAMINAR LMBR/SAC: CPT | Performed by: ANESTHESIOLOGY

## 2023-04-23 PROCEDURE — 86901 BLOOD TYPING SEROLOGIC RH(D): CPT

## 2023-04-23 PROCEDURE — 7100000011 HC PHASE II RECOVERY - ADDTL 15 MIN

## 2023-04-23 RX ORDER — IBUPROFEN 800 MG/1
800 TABLET ORAL EVERY 6 HOURS PRN
Status: DISCONTINUED | OUTPATIENT
Start: 2023-04-23 | End: 2023-04-25 | Stop reason: HOSPADM

## 2023-04-23 RX ORDER — ONDANSETRON 2 MG/ML
4 INJECTION INTRAMUSCULAR; INTRAVENOUS EVERY 6 HOURS PRN
Status: DISCONTINUED | OUTPATIENT
Start: 2023-04-23 | End: 2023-04-23

## 2023-04-23 RX ORDER — MISOPROSTOL 200 UG/1
200 TABLET ORAL PRN
Status: DISCONTINUED | OUTPATIENT
Start: 2023-04-23 | End: 2023-04-25 | Stop reason: HOSPADM

## 2023-04-23 RX ORDER — EPHEDRINE SULFATE/0.9% NACL/PF 50 MG/5 ML
SYRINGE (ML) INTRAVENOUS PRN
Status: DISCONTINUED | OUTPATIENT
Start: 2023-04-23 | End: 2023-04-23 | Stop reason: SDUPTHER

## 2023-04-23 RX ORDER — FAMOTIDINE 20 MG/1
20 TABLET, FILM COATED ORAL 2 TIMES DAILY PRN
Status: DISCONTINUED | OUTPATIENT
Start: 2023-04-23 | End: 2023-04-25 | Stop reason: HOSPADM

## 2023-04-23 RX ORDER — ONDANSETRON 8 MG/1
8 TABLET, ORALLY DISINTEGRATING ORAL EVERY 8 HOURS PRN
Status: DISCONTINUED | OUTPATIENT
Start: 2023-04-23 | End: 2023-04-25 | Stop reason: HOSPADM

## 2023-04-23 RX ORDER — SODIUM CHLORIDE, SODIUM LACTATE, POTASSIUM CHLORIDE, AND CALCIUM CHLORIDE .6; .31; .03; .02 G/100ML; G/100ML; G/100ML; G/100ML
500 INJECTION, SOLUTION INTRAVENOUS PRN
Status: DISCONTINUED | OUTPATIENT
Start: 2023-04-23 | End: 2023-04-23

## 2023-04-23 RX ORDER — CARBOPROST TROMETHAMINE 250 UG/ML
250 INJECTION, SOLUTION INTRAMUSCULAR PRN
Status: DISCONTINUED | OUTPATIENT
Start: 2023-04-23 | End: 2023-04-23

## 2023-04-23 RX ORDER — SODIUM CHLORIDE 9 MG/ML
25 INJECTION, SOLUTION INTRAVENOUS PRN
Status: DISCONTINUED | OUTPATIENT
Start: 2023-04-23 | End: 2023-04-23

## 2023-04-23 RX ORDER — OXYCODONE HYDROCHLORIDE 5 MG/1
10 TABLET ORAL EVERY 4 HOURS PRN
Status: DISCONTINUED | OUTPATIENT
Start: 2023-04-23 | End: 2023-04-25 | Stop reason: HOSPADM

## 2023-04-23 RX ORDER — OXYCODONE HYDROCHLORIDE 5 MG/1
5 TABLET ORAL EVERY 4 HOURS PRN
Status: DISCONTINUED | OUTPATIENT
Start: 2023-04-23 | End: 2023-04-25 | Stop reason: HOSPADM

## 2023-04-23 RX ORDER — SODIUM CHLORIDE 0.9 % (FLUSH) 0.9 %
5-40 SYRINGE (ML) INJECTION EVERY 12 HOURS SCHEDULED
Status: DISCONTINUED | OUTPATIENT
Start: 2023-04-23 | End: 2023-04-25

## 2023-04-23 RX ORDER — SIMETHICONE 80 MG
80 TABLET,CHEWABLE ORAL EVERY 6 HOURS PRN
Status: DISCONTINUED | OUTPATIENT
Start: 2023-04-23 | End: 2023-04-25 | Stop reason: HOSPADM

## 2023-04-23 RX ORDER — DEXTROSE, SODIUM CHLORIDE, SODIUM LACTATE, POTASSIUM CHLORIDE, AND CALCIUM CHLORIDE 5; .6; .31; .03; .02 G/100ML; G/100ML; G/100ML; G/100ML; G/100ML
INJECTION, SOLUTION INTRAVENOUS CONTINUOUS
Status: DISCONTINUED | OUTPATIENT
Start: 2023-04-23 | End: 2023-04-23

## 2023-04-23 RX ORDER — ACETAMINOPHEN 500 MG
1000 TABLET ORAL EVERY 6 HOURS PRN
Status: DISCONTINUED | OUTPATIENT
Start: 2023-04-23 | End: 2023-04-25 | Stop reason: HOSPADM

## 2023-04-23 RX ORDER — ROPIVACAINE HYDROCHLORIDE 2 MG/ML
INJECTION, SOLUTION EPIDURAL; INFILTRATION; PERINEURAL CONTINUOUS PRN
Status: DISCONTINUED | OUTPATIENT
Start: 2023-04-23 | End: 2023-04-23 | Stop reason: SDUPTHER

## 2023-04-23 RX ORDER — SODIUM CHLORIDE 0.9 % (FLUSH) 0.9 %
5-40 SYRINGE (ML) INJECTION PRN
Status: DISCONTINUED | OUTPATIENT
Start: 2023-04-23 | End: 2023-04-25

## 2023-04-23 RX ORDER — METHYLERGONOVINE MALEATE 0.2 MG/ML
200 INJECTION INTRAVENOUS PRN
Status: DISCONTINUED | OUTPATIENT
Start: 2023-04-23 | End: 2023-04-25 | Stop reason: HOSPADM

## 2023-04-23 RX ORDER — SODIUM CHLORIDE 0.9 % (FLUSH) 0.9 %
5-40 SYRINGE (ML) INJECTION EVERY 12 HOURS SCHEDULED
Status: DISCONTINUED | OUTPATIENT
Start: 2023-04-23 | End: 2023-04-23

## 2023-04-23 RX ORDER — SODIUM CHLORIDE 9 MG/ML
INJECTION, SOLUTION INTRAVENOUS PRN
Status: DISCONTINUED | OUTPATIENT
Start: 2023-04-23 | End: 2023-04-25

## 2023-04-23 RX ORDER — SODIUM CHLORIDE, SODIUM LACTATE, POTASSIUM CHLORIDE, CALCIUM CHLORIDE 600; 310; 30; 20 MG/100ML; MG/100ML; MG/100ML; MG/100ML
INJECTION, SOLUTION INTRAVENOUS CONTINUOUS
Status: DISCONTINUED | OUTPATIENT
Start: 2023-04-23 | End: 2023-04-23

## 2023-04-23 RX ORDER — LANOLIN
CREAM (ML) TOPICAL PRN
Status: DISCONTINUED | OUTPATIENT
Start: 2023-04-23 | End: 2023-04-25 | Stop reason: HOSPADM

## 2023-04-23 RX ORDER — MORPHINE SULFATE 2 MG/ML
2 INJECTION, SOLUTION INTRAMUSCULAR; INTRAVENOUS
Status: DISCONTINUED | OUTPATIENT
Start: 2023-04-23 | End: 2023-04-23

## 2023-04-23 RX ORDER — METHYLERGONOVINE MALEATE 0.2 MG/ML
200 INJECTION INTRAVENOUS PRN
Status: DISCONTINUED | OUTPATIENT
Start: 2023-04-23 | End: 2023-04-23

## 2023-04-23 RX ORDER — DEXTROSE, SODIUM CHLORIDE, SODIUM LACTATE, POTASSIUM CHLORIDE, AND CALCIUM CHLORIDE 5; .6; .31; .03; .02 G/100ML; G/100ML; G/100ML; G/100ML; G/100ML
INJECTION, SOLUTION INTRAVENOUS CONTINUOUS
Status: DISCONTINUED | OUTPATIENT
Start: 2023-04-23 | End: 2023-04-25

## 2023-04-23 RX ORDER — ACETAMINOPHEN 325 MG/1
650 TABLET ORAL EVERY 4 HOURS PRN
Status: DISCONTINUED | OUTPATIENT
Start: 2023-04-23 | End: 2023-04-23

## 2023-04-23 RX ORDER — TRANEXAMIC ACID 10 MG/ML
1000 INJECTION, SOLUTION INTRAVENOUS
Status: DISCONTINUED | OUTPATIENT
Start: 2023-04-23 | End: 2023-04-23

## 2023-04-23 RX ORDER — SODIUM CHLORIDE, SODIUM LACTATE, POTASSIUM CHLORIDE, AND CALCIUM CHLORIDE .6; .31; .03; .02 G/100ML; G/100ML; G/100ML; G/100ML
1000 INJECTION, SOLUTION INTRAVENOUS PRN
Status: DISCONTINUED | OUTPATIENT
Start: 2023-04-23 | End: 2023-04-23

## 2023-04-23 RX ORDER — MISOPROSTOL 200 UG/1
800 TABLET ORAL PRN
Status: DISCONTINUED | OUTPATIENT
Start: 2023-04-23 | End: 2023-04-23

## 2023-04-23 RX ORDER — DOCUSATE SODIUM 100 MG/1
100 CAPSULE, LIQUID FILLED ORAL 2 TIMES DAILY
Status: DISCONTINUED | OUTPATIENT
Start: 2023-04-23 | End: 2023-04-25 | Stop reason: HOSPADM

## 2023-04-23 RX ORDER — DOCUSATE SODIUM 100 MG/1
100 CAPSULE, LIQUID FILLED ORAL 2 TIMES DAILY
Status: DISCONTINUED | OUTPATIENT
Start: 2023-04-23 | End: 2023-04-23

## 2023-04-23 RX ORDER — SODIUM CHLORIDE 0.9 % (FLUSH) 0.9 %
5-40 SYRINGE (ML) INJECTION PRN
Status: DISCONTINUED | OUTPATIENT
Start: 2023-04-23 | End: 2023-04-23

## 2023-04-23 RX ADMIN — Medication 15 MG: at 14:43

## 2023-04-23 RX ADMIN — SODIUM CHLORIDE, POTASSIUM CHLORIDE, SODIUM LACTATE AND CALCIUM CHLORIDE 1000 ML: 600; 310; 30; 20 INJECTION, SOLUTION INTRAVENOUS at 14:20

## 2023-04-23 RX ADMIN — ROPIVACAINE HYDROCHLORIDE 4 ML: 2 INJECTION, SOLUTION EPIDURAL; INFILTRATION at 14:37

## 2023-04-23 RX ADMIN — MORPHINE SULFATE 2 MG: 2 INJECTION, SOLUTION INTRAMUSCULAR; INTRAVENOUS at 11:15

## 2023-04-23 RX ADMIN — Medication 2 MILLI-UNITS/MIN: at 08:36

## 2023-04-23 RX ADMIN — SODIUM CHLORIDE, SODIUM LACTATE, POTASSIUM CHLORIDE, CALCIUM CHLORIDE AND DEXTROSE MONOHYDRATE: 5; 600; 310; 30; 20 INJECTION, SOLUTION INTRAVENOUS at 08:00

## 2023-04-23 RX ADMIN — Medication 15 MG: at 15:20

## 2023-04-23 RX ADMIN — Medication 87.3 MILLI-UNITS/MIN: at 18:14

## 2023-04-23 RX ADMIN — Medication 166.7 ML: at 18:01

## 2023-04-23 RX ADMIN — MORPHINE SULFATE 2 MG: 2 INJECTION, SOLUTION INTRAMUSCULAR; INTRAVENOUS at 13:10

## 2023-04-23 RX ADMIN — IBUPROFEN 800 MG: 800 TABLET, FILM COATED ORAL at 21:00

## 2023-04-23 RX ADMIN — SODIUM CHLORIDE, SODIUM LACTATE, POTASSIUM CHLORIDE, CALCIUM CHLORIDE AND DEXTROSE MONOHYDRATE: 5; 600; 310; 30; 20 INJECTION, SOLUTION INTRAVENOUS at 15:52

## 2023-04-23 RX ADMIN — ROPIVACAINE HYDROCHLORIDE 4 ML: 2 INJECTION, SOLUTION EPIDURAL; INFILTRATION at 14:39

## 2023-04-23 RX ADMIN — ROPIVACAINE HYDROCHLORIDE 8 ML/HR: 2 INJECTION, SOLUTION EPIDURAL; INFILTRATION at 14:40

## 2023-04-23 ASSESSMENT — PAIN DESCRIPTION - LOCATION
LOCATION: ABDOMEN

## 2023-04-23 ASSESSMENT — PAIN DESCRIPTION - DESCRIPTORS
DESCRIPTORS: SHARP
DESCRIPTORS: SHARP
DESCRIPTORS: CRAMPING

## 2023-04-23 ASSESSMENT — PAIN SCALES - GENERAL
PAINLEVEL_OUTOF10: 6
PAINLEVEL_OUTOF10: 8
PAINLEVEL_OUTOF10: 1

## 2023-04-23 ASSESSMENT — PAIN DESCRIPTION - ORIENTATION: ORIENTATION: LOWER

## 2023-04-24 LAB
BLOOD BANK CMNT PATIENT-IMP: NORMAL
FETAL SCREEN: NORMAL

## 2023-04-24 PROCEDURE — 1100000000 HC RM PRIVATE

## 2023-04-24 PROCEDURE — 96372 THER/PROPH/DIAG INJ SC/IM: CPT

## 2023-04-24 PROCEDURE — 85461 HEMOGLOBIN FETAL: CPT

## 2023-04-24 PROCEDURE — 6360000002 HC RX W HCPCS: Performed by: OBSTETRICS & GYNECOLOGY

## 2023-04-24 PROCEDURE — 6370000000 HC RX 637 (ALT 250 FOR IP): Performed by: OBSTETRICS & GYNECOLOGY

## 2023-04-24 PROCEDURE — 36415 COLL VENOUS BLD VENIPUNCTURE: CPT

## 2023-04-24 RX ADMIN — IBUPROFEN 800 MG: 800 TABLET, FILM COATED ORAL at 03:56

## 2023-04-24 RX ADMIN — RHO(D) IMMUNE GLOBULIN (HUMAN) 300 MCG: 1500 SOLUTION INTRAMUSCULAR at 16:22

## 2023-04-24 RX ADMIN — IBUPROFEN 800 MG: 800 TABLET, FILM COATED ORAL at 10:14

## 2023-04-24 RX ADMIN — IBUPROFEN 800 MG: 800 TABLET, FILM COATED ORAL at 16:22

## 2023-04-24 ASSESSMENT — PAIN DESCRIPTION - LOCATION: LOCATION: ABDOMEN;PERINEUM

## 2023-04-24 ASSESSMENT — PAIN DESCRIPTION - DESCRIPTORS: DESCRIPTORS: CRAMPING;SORE

## 2023-04-24 ASSESSMENT — PAIN SCALES - GENERAL: PAINLEVEL_OUTOF10: 1

## 2023-04-24 NOTE — LACTATION NOTE
This note was copied from a baby's chart. Mom plans to pump and feed back to infant via bottle. Planning to supplement with formula if needed. Pump and pumping supplies provided. Demonstrated how to put pump parts together, take apart and wash with [facility] soap provided after each use. Mom educated on what to expect while pumping, to pump every 3 hours for 15 min and how long breast milk can stay at room temp. Mom verbalized understanding. Pumped 22ml first time. Will continue to help with pumping and feeds as needed.

## 2023-04-24 NOTE — LACTATION NOTE
This note was copied from a baby's chart. Individualized Feeding Plan for Breastfeeding   Lactation Services [phone]    As much as possible, hold your baby on your chest so babys bare skin is against your bare skin with a blanket covering babys back, especially 30 minutes before it is time for baby to eat. Watch for early feeding cues such as, licking lips, sucking motions, rooting, hands to mouth. Crying is a late feeding cue. Feed your baby at least 8 times in 24 hours, or more if your baby is showing feeding cues. If baby is sleepy put baby skin to skin and watch for hunger cues. To rouse baby: unwrap, undress, massage hands, feet, & back, change diaper, gently change babys position from lying to sitting. 15-20 minutes on the first breast of active breastfeeding is considered a good feeding. Good, active breastfeeding is when baby is alert, tugging the nipple, their ear may move, and you can hear swallows. Allow baby to finish the first side before changing sides. Sleeping at the breast or only brief, light sucks should not be considered a good, full breastfeed. At each feedin.  Baby needs to NURSE WELL x 15-20 minutes on at least first breast, burp and offer 2nd breast at every feeding. If no sustained latch only attempt at breast for 10 minutes. If baby does not latch on and feed well on at least one side, you should:             2. Double pump for 15 minutes with breast massage and compression. Hand express for an additional 2-3 minutes per side. Pump after each feeding attempt or poor feeding, up to 8 times per day. If you are not putting baby to the breast you need to pump 8 times a day. Pump every 3 hours. 3. Give baby all of the breast milk you obtain using a straight syringe or  curved syringe.     If baby does NOT have enough wet and dirty diapers per day, is jaundiced/lethargic, or has significant weight loss AND you do NOT pump enough milk

## 2023-04-24 NOTE — LACTATION NOTE

## 2023-04-24 NOTE — LACTATION NOTE
This note was copied from a baby's chart. In to see mom and infant for first time. 2nd baby. She plans just to pump and bottle feed w/ this infant. She has been pumping and getting around 4-6 mls after initial pump session. She is also following up w/ formula as well. Gave printed feeding plan to mom for guidance on pump and bottle amounts for first week. Mom verbalized understanding and appreciative. Reviewed normal pump volumes for first week of life and breast milk storage rules. Mom states has pump to use once at home. Measured mom's nipples and gave her smaller flange size. No further needs at this time.  Lactation to follow up in am.

## 2023-04-24 NOTE — ANESTHESIA POSTPROCEDURE EVALUATION
Department of Anesthesiology  Postprocedure Note    Patient: Julius Olmedo  MRN: [id number]  YOB: 2000  Date of evaluation: 4/24/2023      Procedure Summary     Date: 04/23/23 Room / Location:     Anesthesia Start: 9269 Anesthesia Stop: 5636    Procedure: Labor Analgesia Diagnosis:     Scheduled Providers:  Responsible Provider: Petar Celeste MD    Anesthesia Type: epidural ASA Status: 2          Anesthesia Type: No value filed. Yasir Phase I:      Yasir Phase II:        Anesthesia Post Evaluation    Patient location during evaluation: floor  Patient participation: complete - patient participated  Level of consciousness: awake and alert  Airway patency: patent  Nausea: well controlled. Complications: no  Cardiovascular status: acceptable.   Respiratory status: acceptable  Hydration status: stable

## 2023-04-24 NOTE — PROGRESS NOTES
Daily Progress note:    Patient is PPD#1 S/P vaginal delivery at term . No complaints today. Lochia < menses. No GI/ issues. No F/C.     VITALS  Patient Vitals for the past 24 hrs:   Temp Pulse Resp BP SpO2   04/24/23 0713 97.6 °F (36.4 °C) 84 16 126/80 100 %   04/23/23 2250 -- (!) 115 18 134/84 98 %   04/23/23 2056 98.6 °F (37 °C) (!) 110 20 (!) 140/87 98 %   04/23/23 1959 -- 93 -- (!) 141/68 --   04/23/23 1944 -- (!) 101 -- (!) 146/72 --   04/23/23 1859 98.5 °F (36.9 °C) (!) 105 20 129/63 100 %   04/23/23 1844 -- (!) 120 -- 134/71 --   04/23/23 1832 -- (!) 117 -- 122/71 --   04/23/23 1802 98.6 °F (37 °C) (!) 112 18 131/69 --   04/23/23 1759 -- (!) 110 -- 119/78 --   04/23/23 1745 -- (!) 123 -- 120/73 --   04/23/23 1730 -- (!) 116 -- 125/76 --   04/23/23 1714 -- (!) 122 -- 130/74 --   04/23/23 1700 -- (!) 127 -- 138/81 --   04/23/23 1645 -- (!) 115 -- 132/81 --   04/23/23 1630 -- (!) 110 -- (!) 144/71 --   04/23/23 1624 -- (!) 113 -- -- 100 %   04/23/23 1619 -- (!) 114 -- -- 100 %   04/23/23 1614 -- (!) 120 -- 132/72 100 %   04/23/23 1601 -- (!) 105 -- (!) 147/82 --   04/23/23 1544 -- 93 -- (!) 123/58 --   04/23/23 1529 97.9 °F (36.6 °C) (!) 106 -- 123/69 --   04/23/23 1520 -- (!) 105 -- 125/66 --   04/23/23 1516 -- 88 -- (!) 79/44 --   04/23/23 1515 -- 85 -- (!) 69/31 --   04/23/23 1459 -- (!) 113 -- 126/67 --   04/23/23 1453 -- 96 -- 139/69 --   04/23/23 1447 -- 100 -- (!) 128/57 --   04/23/23 1446 -- 90 -- 136/66 --   04/23/23 1445 -- 82 -- 131/68 --   04/23/23 1442 -- 100 -- (!) 109/57 --   04/23/23 1440 -- (!) 107 -- (!) 118/57 --   04/23/23 1438 -- (!) 107 -- (!) 123/55 --   04/23/23 1437 -- (!) 114 -- 126/68 --   04/23/23 1434 -- (!) 111 -- 139/78 --   04/23/23 1433 -- (!) 122 -- 135/77 --   04/23/23 1432 -- 100 -- 135/77 --   04/23/23 1429 -- (!) 120 -- 139/82 --   04/23/23 1344 -- (!) 102 -- 129/80 --   04/23/23 1330 -- (!) 103 -- 133/86 --   04/23/23 1314 -- 100 -- 121/75 --   04/23/23 1244 -- 98 --

## 2023-04-24 NOTE — CARE COORDINATION
Chart reviewed - no needs identified. SW met with patient to complete initial assessment. Patient denies any history of postpartum depression/anxiety. Patient given informational packet on  mood & anxiety disorders (resources/education). Family denies any additional needs from  at this time. Family has 's contact information should any needs/questions arise.     ELIAZAR Covarrubias, [address] [facility]   [phone]

## 2023-04-25 VITALS
DIASTOLIC BLOOD PRESSURE: 73 MMHG | TEMPERATURE: 97.9 F | RESPIRATION RATE: 16 BRPM | HEART RATE: 82 BPM | SYSTOLIC BLOOD PRESSURE: 115 MMHG | OXYGEN SATURATION: 99 %

## 2023-04-25 PROCEDURE — 6370000000 HC RX 637 (ALT 250 FOR IP): Performed by: OBSTETRICS & GYNECOLOGY

## 2023-04-25 RX ORDER — IBUPROFEN 600 MG/1
600 TABLET ORAL EVERY 6 HOURS PRN
Qty: 60 TABLET | Refills: 0 | Status: SHIPPED | OUTPATIENT
Start: 2023-04-25

## 2023-04-25 RX ADMIN — IBUPROFEN 800 MG: 800 TABLET, FILM COATED ORAL at 04:54

## 2023-04-25 ASSESSMENT — PAIN DESCRIPTION - LOCATION: LOCATION: ABDOMEN

## 2023-04-25 ASSESSMENT — PAIN DESCRIPTION - DESCRIPTORS: DESCRIPTORS: CRAMPING

## 2023-04-25 ASSESSMENT — PAIN DESCRIPTION - ORIENTATION: ORIENTATION: ANTERIOR;LOWER

## 2023-04-25 ASSESSMENT — PAIN SCALES - GENERAL: PAINLEVEL_OUTOF10: 1

## 2023-04-25 NOTE — PROGRESS NOTES
[address], [place] [address], [address]  [phone]    Maurice Stapleton MD, Stewart Huynh, [facility]  Huong Ansari MD, [address] [facility]    Patient is S/P vaginal delivery at 36 5/7 weeks, PROM. No complaints today. Lochia < menses. No GI/ issues. No F/C. VITALS  Patient Vitals for the past 24 hrs:   BP Temp Temp src Pulse Resp SpO2   23 0703 115/73 97.9 °F (36.6 °C) Oral 82 16 99 %   23 2303 117/79 97.9 °F (36.6 °C) Oral 95 17 100 %   23 1518 128/83 97.7 °F (36.5 °C) Oral (!) 116 18 99 %        CV - RRR  LUNGS - CTA bilaterally  ABD - soft, approp tend, FF below umbilicus  EXT - tr edema bilaterally          Labs:  No results found for this or any previous visit (from the past 24 hour(s)). PPD #2      Pt is breast feeding. No issues or complaints today. Stable.   Routine PP instructions      Maurice Stapleton MD  9:54 AM  23 Posterior Auricular Interpolation Flap Division And Inset Text: Division and inset of the posterior auricular interpolation flap was performed to achieve optimal aesthetic result, restore normal anatomic appearance and avoid distortion of normal anatomy, expedite and facilitate wound healing, achieve optimal functional result and because linear closure either not possible or would produce suboptimal result. The patient was prepped and draped in the usual manner. The pedicle was infiltrated with local anesthesia. The pedicle was sectioned with a #15 blade. The pedicle was de-bulked and trimmed to match the shape of the defect. Hemostasis was achieved. The flap donor site and free margin of the flap were secured with deep buried sutures and the wound edges were re-approximated.

## 2023-04-25 NOTE — PLAN OF CARE
Problem: Postpartum  Goal: Experiences normal postpartum course  Description:  Postpartum OB-Pregnancy care plan goal which identifies if the mother is experiencing a normal postpartum course  2023 1056 by Keisha Massey RN  Outcome: Completed  2023 by Hermilo Crow RN  Outcome: Progressing  Goal: Appropriate maternal -  bonding  Description:  Postpartum OB-Pregnancy care plan goal which identifies if the mother and  are bonding appropriately  2023 1056 by Keisha Massey RN  Outcome: Completed  2023 by Hermilo Crow RN  Outcome: Progressing  Goal: Establishment of infant feeding pattern  Description:  Postpartum OB-Pregnancy care plan goal which identifies if the mother is establishing a feeding pattern with their   2023 1056 by Keisha Massey RN  Outcome: Completed  2023 by Hermilo Crow RN  Outcome: Progressing  Goal: Incisions, wounds, or drain sites healing without S/S of infection  2023 1056 by Keisha Massey RN  Outcome: Completed  2023 by Hermilo Crow RN  Outcome: Progressing     Problem: Pain  Goal: Verbalizes/displays adequate comfort level or baseline comfort level  2023 1056 by Keisha Massey RN  Outcome: Completed  2023 by Hermilo Crow RN  Outcome: Progressing  Flowsheets (Taken 2023 194)  Verbalizes/displays adequate comfort level or baseline comfort level:   Encourage patient to monitor pain and request assistance   Administer analgesics based on type and severity of pain and evaluate response   Assess pain using appropriate pain scale   Notify Licensed Independent Practitioner if interventions unsuccessful or patient reports new pain     Problem: Infection - Adult  Goal: Absence of infection at discharge  2023 1056 by Keisha Massey RN  Outcome: Completed  2023 by Hermilo Crow RN  Outcome: Progressing  Goal: Absence of infection during

## 2023-04-25 NOTE — LACTATION NOTE
This note was copied from a baby's chart. Mom and baby are going home today. Continue to offer the breast without restriction. Mom's milk should be fully in over the next few days. Reviewed engorgement precautions. Hand Expression has been demoed and written hand-out reviewed. As milk comes in baby will be more alert at the breast and swallows will be more obvious. Breasts may feel softer once baby has finished nursing. Baby should be back to birth weight by 3weeks of age. And then gain on average 1 oz per day for the next 2-3 months. Reviewed babies should be exclusively breastfeeding for the first 6 months and that breastfeeding should continue after introduction of appropriate complimentary foods after 6 months. Initial output should be at least 1 wet and 1 bowel movement for each day old baby is. By day 5-7 once milk is fully in baby will consistently have 6 or more soaking wet diapers and about 4 bowel movement. Some babies have a bowel movement with every feeding and some have 1-3 large bowel movements each day. Inadequate output may indicate inadequate feedings and should be reported to your Pediatrician. Bowel habits may change as baby gets older. Encouraged follow-up at Pediatrician in 1-2 days, no later than 1 week of age. Call [facility] for any questions as needed or to set up an OP visit. OP phone calls are returned within 24 hours. Community Breastfeeding Resource List given. Pumping and feeding pumped milk plus formula. Baby feeding well via bottle. Mom denies any needs or questions related to pumping. Was given written feeding plan yesterday with volumes for use at home. Feeding Neosure given 36 week gestation. Has pump for home use, aware of pump rental option if needed.

## 2023-04-25 NOTE — PROGRESS NOTES
[address] 32 April 25, 2023       RE: Sin Murry      To Whom it May Concern: This is to certify that Sin Murry has been in the hospital from 4/23/2023 to 4/25/2023 having a baby. Call [phone] with any questions.       Sincerely,    Bella Becker RN

## 2023-04-25 NOTE — DISCHARGE INSTRUCTIONS
acetaminophen (Tylenol) can be harmful. Stitches  If you have stitches, they will dissolve on their own and don't need to be removed. Follow your doctor's instructions for cleaning the stitched area. Put ice or a cold pack on your painful area for 10 to 20 minutes at a time, several times a day, for the first few days. Put a thin cloth between the ice and your skin. Sit in a few inches of warm water (sitz bath) 3 times a day and after bowel movements. The warm water helps with pain and itching. If you don't have a tub, a warm shower might help. Breast fullness  Your breasts may overfill (engorge) in the first few days after delivery. To help milk flow and to relieve pain, warm your breasts in the shower or by using warm, moist towels before nursing. If you aren't nursing, don't put warmth on your breasts or touch your breasts. Wear a bra that fits well and use ice until the fullness goes away. This usually takes 2 to 3 days. Put ice or a cold pack on your breast after nursing to reduce swelling and pain. Put a thin cloth between the ice and your skin. Activity  Eat a balanced diet. Don't try to lose weight by cutting calories. Keep taking your prenatal vitamins, or take a multivitamin. Get as much rest as you can. Try to take naps when your baby sleeps during the day. Get some exercise every day. But don't do any heavy exercise until your doctor says it is okay. Wait until you are healed (about 4 to 6 weeks) before you have sexual intercourse. Your doctor will tell you when it is okay to have sex. If you don't want to get pregnant, talk to your doctor about birth control. You can get pregnant even before your period returns. Also, you can get pregnant while you are breastfeeding. Mental health  It's normal to have some sadness, anxiety, sleeplessness, and mood swings after you go home.  If you feel upset or hopeless for more than a few days or are having trouble doing the things you need to do, talk to

## 2023-04-25 NOTE — DISCHARGE SUMMARY
[facility]  [address], [place], [place] [address], [address] [facility] Bruce Murry MD, REX Yepez-BC  Marilin Velazquez MD, [address] [facility]  Date of Admission:  2023  6:59 AM  Date of Discharge:  2023 12:48 PM    Patient Active Problem List   Diagnosis    Hashimoto's thyroiditis    Thyroid disease affecting pregnancy    Rh negative state in antepartum period    Multigravida in third trimester    Anemia affecting pregnancy in third trimester    Size of fetus inconsistent with dates in third trimester    Polyhydramnios affecting pregnancy in third trimester    PROM (premature rupture of membranes)    Normal delivery        Celso Gerber 20 y.o. [id number] presented at 36w5d with PROM  Pt had  without incident. See delivery note for all delivery details. Pt's PP course was uneventful. On day of D/C, she was ambulating well, afebrile, with lochia < menses. She was discharged home with medications as below. Pt was breast feeding on discharge. Routine PP instructions given to patient.   She is to follow up with [facility] in 6 weeks for PP exam.       Medication List        START taking these medications      ibuprofen 600 MG tablet  Commonly known as: ADVIL;MOTRIN  Take 1 tablet by mouth every 6 hours as needed for Pain            CONTINUE taking these medications      ferrous sulfate 325 (65 Fe) MG tablet  Commonly known as: IRON 325  Take 1 tablet by mouth 2 times daily     Prenatal Vitamin 27-0.8 MG Tabs  Take 1 tablet by mouth daily            STOP taking these medications      ondansetron 4 MG tablet  Commonly known as: ZOFRAN     terconazole 0.4 % vaginal cream  Commonly known as: TERAZOL 7               Where to Get Your Medications        These medications were sent to [facility] [facility], [address], [address] [place] [address]      Phone: [phone]   ibuprofen 600 MG tablet         Raza

## 2023-04-25 NOTE — PLAN OF CARE
Problem: Postpartum  Goal: Experiences normal postpartum course  Description:  Postpartum OB-Pregnancy care plan goal which identifies if the mother is experiencing a normal postpartum course  Outcome: Progressing  Goal: Appropriate maternal -  bonding  Description:  Postpartum OB-Pregnancy care plan goal which identifies if the mother and  are bonding appropriately  Outcome: Progressing  Goal: Establishment of infant feeding pattern  Description:  Postpartum OB-Pregnancy care plan goal which identifies if the mother is establishing a feeding pattern with their   Outcome: Progressing  Goal: Incisions, wounds, or drain sites healing without S/S of infection  Outcome: Progressing     Problem: Pain  Goal: Verbalizes/displays adequate comfort level or baseline comfort level  Outcome: Progressing  Flowsheets (Taken 2023)  Verbalizes/displays adequate comfort level or baseline comfort level:   Encourage patient to monitor pain and request assistance   Administer analgesics based on type and severity of pain and evaluate response   Assess pain using appropriate pain scale   Notify Licensed Independent Practitioner if interventions unsuccessful or patient reports new pain     Problem: Infection - Adult  Goal: Absence of infection at discharge  Outcome: Progressing  Goal: Absence of infection during hospitalization  Outcome: Progressing  Goal: Absence of fever/infection during anticipated neutropenic period  Outcome: Progressing     Problem: Safety - Adult  Goal: Free from fall injury  Outcome: Progressing     Problem: Discharge Planning  Goal: Discharge to home or other facility with appropriate resources  Outcome: Progressing

## 2023-05-30 ENCOUNTER — POSTPARTUM VISIT (OUTPATIENT)
Dept: OBGYN CLINIC | Age: 23
End: 2023-05-30
Payer: MEDICAID

## 2023-05-30 VITALS
HEIGHT: 62 IN | BODY MASS INDEX: 30.91 KG/M2 | WEIGHT: 168 LBS | SYSTOLIC BLOOD PRESSURE: 136 MMHG | DIASTOLIC BLOOD PRESSURE: 76 MMHG

## 2023-05-30 PROBLEM — E07.9 THYROID DISEASE AFFECTING PREGNANCY: Status: RESOLVED | Noted: 2019-11-20 | Resolved: 2023-05-30

## 2023-05-30 PROBLEM — O40.3XX0 POLYHYDRAMNIOS AFFECTING PREGNANCY IN THIRD TRIMESTER: Status: RESOLVED | Noted: 2023-04-19 | Resolved: 2023-05-30

## 2023-05-30 PROBLEM — Z34.83 MULTIGRAVIDA IN THIRD TRIMESTER: Status: RESOLVED | Noted: 2022-10-10 | Resolved: 2023-05-30

## 2023-05-30 PROBLEM — O26.899 RH NEGATIVE STATE IN ANTEPARTUM PERIOD: Status: RESOLVED | Noted: 2019-11-22 | Resolved: 2023-05-30

## 2023-05-30 PROBLEM — O26.843 SIZE OF FETUS INCONSISTENT WITH DATES IN THIRD TRIMESTER: Status: RESOLVED | Noted: 2023-04-04 | Resolved: 2023-05-30

## 2023-05-30 PROBLEM — Z67.91 RH NEGATIVE STATE IN ANTEPARTUM PERIOD: Status: RESOLVED | Noted: 2019-11-22 | Resolved: 2023-05-30

## 2023-05-30 PROBLEM — O42.90 PROM (PREMATURE RUPTURE OF MEMBRANES): Status: RESOLVED | Noted: 2023-04-23 | Resolved: 2023-05-30

## 2023-05-30 PROBLEM — O99.280 THYROID DISEASE AFFECTING PREGNANCY: Status: RESOLVED | Noted: 2019-11-20 | Resolved: 2023-05-30

## 2023-05-30 PROBLEM — O99.013 ANEMIA AFFECTING PREGNANCY IN THIRD TRIMESTER: Status: RESOLVED | Noted: 2023-02-21 | Resolved: 2023-05-30

## 2023-05-30 RX ORDER — DESOGESTREL AND ETHINYL ESTRADIOL 0.15-0.03
1 KIT ORAL DAILY
Qty: 1 PACKET | Refills: 12 | Status: SHIPPED | OUTPATIENT
Start: 2023-05-30

## 2023-05-30 ASSESSMENT — PATIENT HEALTH QUESTIONNAIRE - PHQ9
1. LITTLE INTEREST OR PLEASURE IN DOING THINGS: 0
SUM OF ALL RESPONSES TO PHQ QUESTIONS 1-9: 0
SUM OF ALL RESPONSES TO PHQ QUESTIONS 1-9: 0
SUM OF ALL RESPONSES TO PHQ9 QUESTIONS 1 & 2: 0
SUM OF ALL RESPONSES TO PHQ QUESTIONS 1-9: 0
SUM OF ALL RESPONSES TO PHQ QUESTIONS 1-9: 0
2. FEELING DOWN, DEPRESSED OR HOPELESS: 0

## 2023-05-30 NOTE — PROGRESS NOTES
Patient comes in today for 6 week post partum visit. Delivery Method: Vaginal    Delivery Date: 04/23/2023    Birth Control: none    Breast/Bottle:  Bottle     Bleeding: Yes    Baby: Doing well    Bowel/Bladder: No issues    Blues: None    Last pap smear:  10/11/2022, Negative     Vitals:    05/30/23 1042   BP: 136/76   Site: Left Upper Arm   Position: Sitting   Weight: 168 lb (76.2 kg)   Height: 5' 2\" (1.575 m)         Bina De Anda MA  11:00 AM  05/30/23 Depth Of Biopsy: dermis

## 2023-05-30 NOTE — PROGRESS NOTES
[address], [place] [address], [address]    Alicia Selby MD, [facility]  Tonny Stone MD, FACOG    6 week Postpartum Office Visit    Noah Frank is a 20 y.o. [id number] that presents for [facility] visit today    Delivery Method: Vaginal     Delivery Date: 2023     Birth Control: none     Breast/Bottle:  Bottle         Bleeding: Yes     Baby: Doing well     Bowel/Bladder: No issues     Blues: None     Last pap smear:  10/11/2022, Negative     OB History    Para Term  AB Living   4 2 1 1 2 2   SAB IAB Ectopic Molar Multiple Live Births   2       0 2      # Outcome Date GA Lbr Alejandro/2nd Weight Sex Delivery Anes PTL Lv   4  23 36w5d 12:18 / 00:08 6 lb 4.9 oz (2.86 kg) M Vag-Spont EPI Y THAIS   3 Term 20 39w2d  6 lb 11.6 oz (3.05 kg) F Vag-Spont EPI N THAIS   2 SAB 2019 14w0d          1 SAB                 Past Medical History:   Diagnosis Date    Anemia affecting pregnancy in third trimester 2023    Hashimoto's thyroiditis        Past Surgical History:   Procedure Laterality Date    DILATION AND CURETTAGE  2019    DILATION AND CURETTAGE OF UTERUS  2019        Social History     Socioeconomic History    Marital status: Single     Spouse name: Not on file    Number of children: Not on file    Years of education: Not on file    Highest education level: Not on file   Occupational History    Not on file   Tobacco Use    Smoking status: Never     Passive exposure: Never    Smokeless tobacco: Never   Substance and Sexual Activity    Alcohol use: Not Currently    Drug use: Never    Sexual activity: Not Currently     Partners: Male     Comment: With father of child   Other Topics Concern    Not on file   Social History Narrative    Abuse: Feels safe at home, no history of physical abuse, no history of sexual abuse       Social Determinants of Health     Financial Resource Strain: Low Risk     Difficulty of Paying Living Expenses:

## 2023-05-30 NOTE — ASSESSMENT & PLAN NOTE
Exam as above.   Pt desires OCPs - Rx sent  Encouraged annual exams with paps as indicated  Pt to F/U with PCP for all non-gyn health related issues

## 2023-11-17 ENCOUNTER — ROUTINE PRENATAL (OUTPATIENT)
Dept: OBGYN CLINIC | Age: 23
End: 2023-11-17

## 2023-11-17 VITALS
WEIGHT: 182 LBS | BODY MASS INDEX: 33.49 KG/M2 | DIASTOLIC BLOOD PRESSURE: 70 MMHG | SYSTOLIC BLOOD PRESSURE: 116 MMHG | HEIGHT: 62 IN

## 2023-11-17 DIAGNOSIS — O26.899 RH NEGATIVE STATE IN ANTEPARTUM PERIOD: ICD-10-CM

## 2023-11-17 DIAGNOSIS — Z87.59 HISTORY OF POLYHYDRAMNIOS: ICD-10-CM

## 2023-11-17 DIAGNOSIS — Z34.82 ENCOUNTER FOR SUPERVISION OF OTHER NORMAL PREGNANCY, SECOND TRIMESTER: ICD-10-CM

## 2023-11-17 DIAGNOSIS — O99.280 THYROID DISEASE AFFECTING PREGNANCY: ICD-10-CM

## 2023-11-17 DIAGNOSIS — Z67.91 RH NEGATIVE STATE IN ANTEPARTUM PERIOD: ICD-10-CM

## 2023-11-17 DIAGNOSIS — E07.9 THYROID DISEASE AFFECTING PREGNANCY: ICD-10-CM

## 2023-11-17 DIAGNOSIS — O09.899 SHORT INTERVAL BETWEEN PREGNANCIES AFFECTING PREGNANCY, ANTEPARTUM: ICD-10-CM

## 2023-11-17 DIAGNOSIS — Z34.82 MULTIGRAVIDA IN SECOND TRIMESTER: ICD-10-CM

## 2023-11-17 DIAGNOSIS — O99.282 THYROID DISEASE DURING PREGNANCY IN SECOND TRIMESTER: ICD-10-CM

## 2023-11-17 DIAGNOSIS — E07.9 THYROID DISEASE DURING PREGNANCY IN SECOND TRIMESTER: ICD-10-CM

## 2023-11-17 DIAGNOSIS — O36.80X0 ENCOUNTER TO DETERMINE FETAL VIABILITY OF PREGNANCY, SINGLE OR UNSPECIFIED FETUS: Primary | ICD-10-CM

## 2023-11-17 PROBLEM — O99.281 THYROID DISEASE DURING PREGNANCY IN FIRST TRIMESTER: Status: ACTIVE | Noted: 2019-11-20

## 2023-11-17 PROBLEM — Z87.42 HISTORY OF CERVICAL POLYPECTOMY: Status: ACTIVE | Noted: 2023-11-17

## 2023-11-17 PROBLEM — Z34.81 MULTIGRAVIDA IN FIRST TRIMESTER: Status: ACTIVE | Noted: 2023-11-17

## 2023-11-17 PROBLEM — Z98.890 HISTORY OF CERVICAL POLYPECTOMY: Status: ACTIVE | Noted: 2023-11-17

## 2023-11-17 LAB
ABO + RH BLD: NORMAL
BASOPHILS # BLD: 0 K/UL (ref 0–0.2)
BASOPHILS NFR BLD: 0 % (ref 0–2)
BLOOD GROUP ANTIBODIES SERPL: NORMAL
DIFFERENTIAL METHOD BLD: NORMAL
EOSINOPHIL # BLD: 0.2 K/UL (ref 0–0.8)
EOSINOPHIL NFR BLD: 2 % (ref 0.5–7.8)
ERYTHROCYTE [DISTWIDTH] IN BLOOD BY AUTOMATED COUNT: 14 % (ref 11.9–14.6)
EST. AVERAGE GLUCOSE BLD GHB EST-MCNC: 105 MG/DL
HBA1C MFR BLD: 5.3 % (ref 4.8–5.6)
HBV SURFACE AG SER QL: NONREACTIVE
HCT VFR BLD AUTO: 39.9 % (ref 35.8–46.3)
HCV AB SER QL: NONREACTIVE
HGB BLD-MCNC: 13 G/DL (ref 11.7–15.4)
HIV 1+2 AB+HIV1 P24 AG SERPL QL IA: NONREACTIVE
HIV 1/2 RESULT COMMENT: NORMAL
IMM GRANULOCYTES # BLD AUTO: 0 K/UL (ref 0–0.5)
IMM GRANULOCYTES NFR BLD AUTO: 0 % (ref 0–5)
LYMPHOCYTES # BLD: 1.4 K/UL (ref 0.5–4.6)
LYMPHOCYTES NFR BLD: 19 % (ref 13–44)
MCH RBC QN AUTO: 27.7 PG (ref 26.1–32.9)
MCHC RBC AUTO-ENTMCNC: 32.6 G/DL (ref 31.4–35)
MCV RBC AUTO: 84.9 FL (ref 82–102)
MONOCYTES # BLD: 0.3 K/UL (ref 0.1–1.3)
MONOCYTES NFR BLD: 4 % (ref 4–12)
NEUTS SEG # BLD: 5.4 K/UL (ref 1.7–8.2)
NEUTS SEG NFR BLD: 75 % (ref 43–78)
NRBC # BLD: 0 K/UL (ref 0–0.2)
PLATELET # BLD AUTO: 172 K/UL (ref 150–450)
PMV BLD AUTO: 11.9 FL (ref 9.4–12.3)
RBC # BLD AUTO: 4.7 M/UL (ref 4.05–5.2)
RUBV IGG SERPL IA-ACNC: 2.7 IU/ML
T4 FREE SERPL-MCNC: 1.1 NG/DL (ref 0.78–1.46)
TSH W FREE THYROID IF ABNORMAL: 0.43 UIU/ML (ref 0.36–3.74)
WBC # BLD AUTO: 7.3 K/UL (ref 4.3–11.1)

## 2023-11-17 RX ORDER — PNV NO.95/FERROUS FUM/FOLIC AC 28MG-0.8MG
1 TABLET ORAL DAILY
Qty: 90 TABLET | Refills: 3 | Status: SHIPPED | OUTPATIENT
Start: 2023-11-17

## 2023-11-17 RX ORDER — ASPIRIN 81 MG/1
81 TABLET, CHEWABLE ORAL DAILY
Qty: 30 TABLET | Refills: 3 | Status: SHIPPED | OUTPATIENT
Start: 2023-11-17

## 2023-11-17 NOTE — PROGRESS NOTES
Patient comes in today for initial prenatal visit. No complaints/concerns today. Patient requesting RX of PNV. Fetal Movements:  No  Contractions:  no  Vaginal Bleeding:  No  Leaking Fluid:  No  GI/ issues:  No    Drug/Alcohol 4P's Plus Screening    1. Have either of your parents ever had a problem with drugs/alcohol/prescription drugs? No  2. Does your partner have a problem with drugs/alcohol/prescription drugs? No  3. In the past, have you ever had a problem with drugs/alcohol/prescription drugs? No  4. Before you were pregnant, in the past month, have you done any drugs, drank any alcohol or abused any prescription drugs? No  If \"YES\" to any of the above, please give further details:  N/a    LAST PAP:  10/11/2022, neg.,     LAST MAMMO:  never     LMP:  Patient's last menstrual period was 08/04/2023.     FAMILY HISTORY OF:   Breast Cancer:  No   Ovarian Cancer:  No   Uterine Cancer:  No   Colon Cancer:  No    Vitals:    11/17/23 0959   BP: 116/70   Site: Right Upper Arm   Position: Sitting   Weight: 82.6 kg (182 lb)   Height: 1.575 m (5' 2\")        Johnny Hwang RN  11/17/23  10:02 AM

## 2023-11-17 NOTE — ASSESSMENT & PLAN NOTE
Instructed pt to contact the office or seek immediate care if develops fever > 101.0, severe lower abdominal pain or heavy vaginal bleeding (soaking 2 or more pads per hour). PNLs, new OB packet today  D/W pt at length genetic testing that is recommended by ACOG -- NIPT, Quad screen (for trisomies and NTD), CF, SMA. Brief discussion of these diseases - conditions that may increase risks, etiology, carrier states, fetal effects, treatment options, etc was undertaken. D/W pt that these are screening tests only and are NOT mandatory. It is her decision whether to have them done and how to proceed with the information afterwards. All questions answered, pt understood and wishes to proceed with indicated tests.

## 2023-11-20 PROBLEM — O09.899 RUBELLA NON-IMMUNE STATUS, ANTEPARTUM: Status: ACTIVE | Noted: 2019-11-22

## 2023-11-20 PROBLEM — Z28.39 RUBELLA NON-IMMUNE STATUS, ANTEPARTUM: Status: ACTIVE | Noted: 2019-11-22

## 2023-11-20 LAB — RPR SER QL: NONREACTIVE

## 2023-11-22 ENCOUNTER — TELEPHONE (OUTPATIENT)
Dept: OBGYN CLINIC | Age: 23
End: 2023-11-22

## 2023-11-22 LAB
AFP INTERP SERPL-IMP: NORMAL
AFP MOM SERPL: 0.81
AFP SERPL-MCNC: 21.2 NG/ML
AGE AT DELIVERY: 26.2 YR
COMMENT: NORMAL
DONOR EGG?: NO
GA METHOD: NORMAL
GA: 15 WEEKS
IDDM PATIENT QL: NO
Lab: NORMAL
MAT SCN FOR FETAL ABNORMALITIES SERPL: NORMAL
MULTIPLE PREGNANCY: NO
NEURAL TUBE DEFECT RISK FETUS: NORMAL
NUMBER OF FETUSES: NO
OTHER INDICATIONS: NO
PREVIOUSLY ELEVATED AFP (Y OR N): NO
PRIOR 1ST TRIM TESTING ?: NO
PRIOR 2ND TRIM TESTING ?: NO
PRIOR DS/NTD SCREEN CURRENT PREGNANCY?: NO
PRIOR PREGNANCY WITH DOWN SYNDROME (Y OR N): NO

## 2023-11-22 NOTE — TELEPHONE ENCOUNTER
Spoke with Sana at [facility]. I gave her the gestational age, patient age and weight. She stated it would updated in about 10-15 minutes.

## 2023-11-27 LAB
Lab: NEGATIVE
Lab: NORMAL
NTRA CYSTIC FIBROSIS: NEGATIVE
NTRA DUCHENNE/BECKER MUSCULAR DYSTROPHY: NEGATIVE
NTRA FETAL FRACTION: NORMAL
NTRA FRAGILE X SYNDROME: NEGATIVE
NTRA GENDER OF FETUS: NORMAL
NTRA MONOSOMY X AGE-BASED RISK TEXT: NORMAL
NTRA MONOSOMY X RESULT TEXT: NORMAL
NTRA MONOSOMY X RISK SCORE TEXT: NORMAL
NTRA SPINAL MUSCULAR ATROPHY: NEGATIVE
NTRA TRIPLOIDY RESULT TEXT: NORMAL
NTRA TRISOMY 13 AGE-BASED RISK TEXT: NORMAL
NTRA TRISOMY 13 RESULT TEXT: NORMAL
NTRA TRISOMY 13 RISK SCORE TEXT: NORMAL
NTRA TRISOMY 18 AGE-BASED RISK TEXT: NORMAL
NTRA TRISOMY 18 RESULT TEXT: NORMAL
NTRA TRISOMY 18 RISK SCORE TEXT: NORMAL
NTRA TRISOMY 21 AGE-BASED RISK TEXT: NORMAL
NTRA TRISOMY 21 RESULT TEXT: NORMAL
NTRA TRISOMY 21 RISK SCORE TEXT: NORMAL

## 2023-11-28 ENCOUNTER — TELEPHONE (OUTPATIENT)
Dept: OBGYN CLINIC | Age: 23
End: 2023-11-28

## 2023-11-28 NOTE — TELEPHONE ENCOUNTER
----- Message from Bruna Early MD sent at 11/28/2023  7:59 AM EST -----  Please let pt know that all her genetic testing was normal/negative  If she wants to know -- it's a GIRL

## 2023-12-18 DIAGNOSIS — Z34.82 MULTIGRAVIDA IN SECOND TRIMESTER: ICD-10-CM

## 2023-12-18 PROBLEM — O09.891 HISTORY OF PRETERM DELIVERY, CURRENTLY PREGNANT IN FIRST TRIMESTER: Status: ACTIVE | Noted: 2023-12-18

## 2023-12-18 LAB
AMPHET UR QL SCN: NEGATIVE
BARBITURATES UR QL SCN: NEGATIVE
BENZODIAZ UR QL: NEGATIVE
CANNABINOIDS UR QL SCN: NEGATIVE
COCAINE UR QL SCN: NEGATIVE
METHADONE UR QL: NEGATIVE
OPIATES UR QL: NEGATIVE
PCP UR QL: NEGATIVE

## 2023-12-21 LAB
C TRACH RRNA SPEC QL NAA+PROBE: NEGATIVE
N GONORRHOEA RRNA SPEC QL NAA+PROBE: NEGATIVE
SPECIMEN SOURCE: NORMAL
T VAGINALIS RRNA SPEC QL NAA+PROBE: NEGATIVE

## 2024-01-02 ENCOUNTER — ROUTINE PRENATAL (OUTPATIENT)
Dept: OBGYN CLINIC | Age: 24
End: 2024-01-02
Payer: MEDICAID

## 2024-01-02 DIAGNOSIS — O09.892 HISTORY OF PRETERM DELIVERY, CURRENTLY PREGNANT IN SECOND TRIMESTER: Primary | ICD-10-CM

## 2024-01-02 DIAGNOSIS — O99.280 THYROID DISEASE AFFECTING PREGNANCY: ICD-10-CM

## 2024-01-02 DIAGNOSIS — E07.9 THYROID DISEASE AFFECTING PREGNANCY: ICD-10-CM

## 2024-01-02 DIAGNOSIS — Z87.59 HISTORY OF POLYHYDRAMNIOS: ICD-10-CM

## 2024-01-02 DIAGNOSIS — Z34.82 MULTIGRAVIDA IN SECOND TRIMESTER: ICD-10-CM

## 2024-01-02 DIAGNOSIS — O09.899 SHORT INTERVAL BETWEEN PREGNANCIES AFFECTING PREGNANCY, ANTEPARTUM: ICD-10-CM

## 2024-01-02 PROCEDURE — 76815 OB US LIMITED FETUS(S): CPT | Performed by: OBSTETRICS & GYNECOLOGY

## 2024-01-02 PROCEDURE — 76817 TRANSVAGINAL US OBSTETRIC: CPT | Performed by: OBSTETRICS & GYNECOLOGY

## 2024-01-31 ENCOUNTER — ROUTINE PRENATAL (OUTPATIENT)
Dept: OBGYN CLINIC | Age: 24
End: 2024-01-31
Payer: MEDICAID

## 2024-01-31 ENCOUNTER — PROCEDURE VISIT (OUTPATIENT)
Dept: OBGYN CLINIC | Age: 24
End: 2024-01-31
Payer: MEDICAID

## 2024-01-31 VITALS
SYSTOLIC BLOOD PRESSURE: 112 MMHG | WEIGHT: 188 LBS | BODY MASS INDEX: 34.6 KG/M2 | DIASTOLIC BLOOD PRESSURE: 68 MMHG | HEIGHT: 62 IN

## 2024-01-31 DIAGNOSIS — Z34.82 MULTIGRAVIDA IN SECOND TRIMESTER: ICD-10-CM

## 2024-01-31 DIAGNOSIS — Z67.91 RH NEGATIVE STATE IN ANTEPARTUM PERIOD: ICD-10-CM

## 2024-01-31 DIAGNOSIS — O26.899 RH NEGATIVE STATE IN ANTEPARTUM PERIOD: ICD-10-CM

## 2024-01-31 DIAGNOSIS — O09.899 SHORT INTERVAL BETWEEN PREGNANCIES AFFECTING PREGNANCY, ANTEPARTUM: ICD-10-CM

## 2024-01-31 DIAGNOSIS — O09.891 HISTORY OF PRETERM DELIVERY, CURRENTLY PREGNANT IN FIRST TRIMESTER: ICD-10-CM

## 2024-01-31 DIAGNOSIS — Z87.59 HISTORY OF POLYHYDRAMNIOS: ICD-10-CM

## 2024-01-31 DIAGNOSIS — O09.892 HISTORY OF PRETERM DELIVERY, CURRENTLY PREGNANT IN SECOND TRIMESTER: ICD-10-CM

## 2024-01-31 DIAGNOSIS — O99.280 THYROID DISEASE AFFECTING PREGNANCY: ICD-10-CM

## 2024-01-31 DIAGNOSIS — E07.9 THYROID DISEASE DURING PREGNANCY IN SECOND TRIMESTER: Primary | ICD-10-CM

## 2024-01-31 DIAGNOSIS — Z28.39 RUBELLA NON-IMMUNE STATUS, ANTEPARTUM: ICD-10-CM

## 2024-01-31 DIAGNOSIS — O09.899 RUBELLA NON-IMMUNE STATUS, ANTEPARTUM: ICD-10-CM

## 2024-01-31 DIAGNOSIS — Z36.2 ENCOUNTER FOR FOLLOW-UP ULTRASOUND OF FETAL ANATOMY: Primary | ICD-10-CM

## 2024-01-31 DIAGNOSIS — O99.282 THYROID DISEASE DURING PREGNANCY IN SECOND TRIMESTER: Primary | ICD-10-CM

## 2024-01-31 DIAGNOSIS — E07.9 THYROID DISEASE AFFECTING PREGNANCY: ICD-10-CM

## 2024-01-31 PROCEDURE — 76817 TRANSVAGINAL US OBSTETRIC: CPT | Performed by: OBSTETRICS & GYNECOLOGY

## 2024-01-31 PROCEDURE — 76816 OB US FOLLOW-UP PER FETUS: CPT | Performed by: OBSTETRICS & GYNECOLOGY

## 2024-01-31 PROCEDURE — 99213 OFFICE O/P EST LOW 20 MIN: CPT | Performed by: NURSE PRACTITIONER

## 2024-01-31 ASSESSMENT — PATIENT HEALTH QUESTIONNAIRE - PHQ9
SUM OF ALL RESPONSES TO PHQ QUESTIONS 1-9: 0
SUM OF ALL RESPONSES TO PHQ9 QUESTIONS 1 & 2: 0
SUM OF ALL RESPONSES TO PHQ QUESTIONS 1-9: 0
SUM OF ALL RESPONSES TO PHQ QUESTIONS 1-9: 0
2. FEELING DOWN, DEPRESSED OR HOPELESS: 0
SUM OF ALL RESPONSES TO PHQ QUESTIONS 1-9: 0
1. LITTLE INTEREST OR PLEASURE IN DOING THINGS: 0

## 2024-01-31 NOTE — PROGRESS NOTES
This is a 23 y.o.   at 25w5d for routine OB visit.    Her Estimated Due Date is 5/10/2024, by Last Menstrual Period    Denies leaking of fluid, vaginal bleeding, or regular contractions. Reports fetal movement.     Current Outpatient Medications on File Prior to Visit   Medication Sig Dispense Refill    Prenatal Vit-Fe Fumarate-FA (PRENATAL VITAMINS) 28-0.8 MG TABS Take 1 tablet by mouth daily 90 tablet 3    aspirin (ASPIRIN CHILDRENS) 81 MG chewable tablet Take 1 tablet by mouth daily 30 tablet 3     No current facility-administered medications on file prior to visit.       No Known Allergies    OB History    Para Term  AB Living   5 2 1 1 2 2   SAB IAB Ectopic Molar Multiple Live Births   2 0 0 0 0 2       # 1 - Date: None, Sex: None, Weight: None, GA: None, Delivery: None, Apgar1: None, Apgar5: None, Living: None, Birth Comments: None    # 2 - Date: 2019, Sex: None, Weight: None, GA: 14w0d, Delivery: None, Apgar1: None, Apgar5: None, Living: None, Birth Comments: None    # 3 - Date: 20, Sex: Female, Weight: 3.05 kg (6 lb 11.6 oz), GA: 39w2d, Delivery: Vaginal, Spontaneous, Apgar1: 7, Apgar5: 9, Living: Living, Birth Comments: None    # 4 - Date: 23, Sex: Male, Weight: 2.86 kg (6 lb 4.9 oz), GA: 36w5d, Delivery: Vaginal, Spontaneous, Apgar1: 8, Apgar5: 9, Living: Living, Birth Comments: None    # 5 - Date: None, Sex: None, Weight: None, GA: None, Delivery: None, Apgar1: None, Apgar5: None, Living: None, Birth Comments: None        Past Medical History:   Diagnosis Date    Anemia affecting pregnancy in third trimester 2023    Hashimoto's thyroiditis        Past Surgical History:   Procedure Laterality Date    DILATION AND CURETTAGE      DILATION AND CURETTAGE OF UTERUS  2019       Family History   Problem Relation Age of Onset    Uterine Cancer Neg Hx     Thyroid Disease Paternal Grandmother         hyperthyroidism?    Thyroid Disease Sister         hypothyroidism?

## 2024-01-31 NOTE — PROGRESS NOTES
Patient comes in today for routine prenatal visit. No complaints/concerns today.     Fetal Movement: Yes  Contractions: No  Vaginal Bleeding: No  Leaking Fluid: No  GI/: No    Vitals:    01/31/24 0909   BP: 112/68   Site: Right Upper Arm   Position: Sitting   Weight: 85.3 kg (188 lb)   Height: 1.575 m (5' 2\")

## 2024-01-31 NOTE — ASSESSMENT & PLAN NOTE
PTL/labor precautions, FMC, and pregnancy warning signs reviewed. Pt advised to call the office at [phone] or go straight to Labor and Delivery at [facility] with any of the following concerns vaginal bleeding, leaking of fluid, jer regularly Q 5-7 minutes for over an hour or not feeling the baby move.   RTO 2 weeks OBV, glucola, cbc, tdap, rhogam, thyroid labs  RTO 4 weeks OBV/US    Anatomy completed today

## 2024-01-31 NOTE — PATIENT INSTRUCTIONS
Thank you for coming.  Return to the office in 2 weeks.  Please call if you have any abdominal pain and 5 contractions in 1 hour, vaginal bleeding or spotting, or leaking of fluid.  You should feel the baby move 10 times in an hour. Monitor this once a day. If you do not feel the baby move this often please call.  Please call immediately if you have a headache that won't go away with tylenol, changes to your vision like funny lights or blurriness, nausea or vomiting, upper abdominal pain, or if the baby does not move at least 10 times in 2 hours.

## 2024-01-31 NOTE — PROGRESS NOTES
I have reviewed the patient's visit today including history, exam and assessment by FATOUMATA Coker.  I agree with treatment/plan as above.    Raza Benedict MD  10:31 AM  01/31/24

## 2024-02-26 PROBLEM — O09.893 HX OF PRETERM DELIVERY, CURRENTLY PREGNANT, THIRD TRIMESTER: Status: ACTIVE | Noted: 2023-12-18

## 2024-02-26 PROBLEM — O99.283 THYROID DISEASE DURING PREGNANCY IN THIRD TRIMESTER: Status: ACTIVE | Noted: 2019-11-20

## 2024-02-26 PROBLEM — Z34.83 MULTIGRAVIDA IN THIRD TRIMESTER: Status: ACTIVE | Noted: 2023-11-17

## 2024-03-01 ENCOUNTER — ROUTINE PRENATAL (OUTPATIENT)
Dept: OBGYN CLINIC | Age: 24
End: 2024-03-01

## 2024-03-01 ENCOUNTER — PROCEDURE VISIT (OUTPATIENT)
Dept: OBGYN CLINIC | Age: 24
End: 2024-03-01

## 2024-03-01 VITALS
DIASTOLIC BLOOD PRESSURE: 70 MMHG | HEIGHT: 62 IN | SYSTOLIC BLOOD PRESSURE: 130 MMHG | WEIGHT: 189 LBS | BODY MASS INDEX: 34.78 KG/M2

## 2024-03-01 DIAGNOSIS — O09.893 HX OF PRETERM DELIVERY, CURRENTLY PREGNANT, THIRD TRIMESTER: ICD-10-CM

## 2024-03-01 DIAGNOSIS — Z67.91 RH NEGATIVE STATE IN ANTEPARTUM PERIOD: ICD-10-CM

## 2024-03-01 DIAGNOSIS — O09.899 RUBELLA NON-IMMUNE STATUS, ANTEPARTUM: ICD-10-CM

## 2024-03-01 DIAGNOSIS — Z34.83 MULTIGRAVIDA IN THIRD TRIMESTER: ICD-10-CM

## 2024-03-01 DIAGNOSIS — O09.891 HISTORY OF PRETERM DELIVERY, CURRENTLY PREGNANT IN FIRST TRIMESTER: ICD-10-CM

## 2024-03-01 DIAGNOSIS — O09.899 SHORT INTERVAL BETWEEN PREGNANCIES AFFECTING PREGNANCY, ANTEPARTUM: Primary | ICD-10-CM

## 2024-03-01 DIAGNOSIS — O09.899 SHORT INTERVAL BETWEEN PREGNANCIES AFFECTING PREGNANCY, ANTEPARTUM: ICD-10-CM

## 2024-03-01 DIAGNOSIS — Z87.59 HISTORY OF POLYHYDRAMNIOS: ICD-10-CM

## 2024-03-01 DIAGNOSIS — Z28.39 RUBELLA NON-IMMUNE STATUS, ANTEPARTUM: ICD-10-CM

## 2024-03-01 DIAGNOSIS — O99.283 THYROID DISEASE DURING PREGNANCY IN THIRD TRIMESTER: ICD-10-CM

## 2024-03-01 DIAGNOSIS — O26.899 RH NEGATIVE STATE IN ANTEPARTUM PERIOD: ICD-10-CM

## 2024-03-01 DIAGNOSIS — Z34.83 MULTIGRAVIDA IN THIRD TRIMESTER: Primary | ICD-10-CM

## 2024-03-01 DIAGNOSIS — E07.9 THYROID DISEASE DURING PREGNANCY IN THIRD TRIMESTER: ICD-10-CM

## 2024-03-01 DIAGNOSIS — E06.3 HASHIMOTO'S THYROIDITIS: ICD-10-CM

## 2024-03-01 DIAGNOSIS — E07.9 THYROID DISEASE DURING PREGNANCY IN SECOND TRIMESTER: ICD-10-CM

## 2024-03-01 DIAGNOSIS — O99.282 THYROID DISEASE DURING PREGNANCY IN SECOND TRIMESTER: ICD-10-CM

## 2024-03-01 LAB
BASOPHILS # BLD: 0 K/UL (ref 0–0.2)
BASOPHILS NFR BLD: 0 % (ref 0–2)
DIFFERENTIAL METHOD BLD: ABNORMAL
EOSINOPHIL # BLD: 0.1 K/UL (ref 0–0.8)
EOSINOPHIL NFR BLD: 2 % (ref 0.5–7.8)
ERYTHROCYTE [DISTWIDTH] IN BLOOD BY AUTOMATED COUNT: 13.9 % (ref 11.9–14.6)
GLUCOSE 1 HOUR: 101 MG/DL
HCT VFR BLD AUTO: 36.3 % (ref 35.8–46.3)
HGB BLD-MCNC: 11.3 G/DL (ref 11.7–15.4)
IMM GRANULOCYTES # BLD AUTO: 0 K/UL (ref 0–0.5)
IMM GRANULOCYTES NFR BLD AUTO: 0 % (ref 0–5)
LYMPHOCYTES # BLD: 1.3 K/UL (ref 0.5–4.6)
LYMPHOCYTES NFR BLD: 18 % (ref 13–44)
MCH RBC QN AUTO: 26.3 PG (ref 26.1–32.9)
MCHC RBC AUTO-ENTMCNC: 31.1 G/DL (ref 31.4–35)
MCV RBC AUTO: 84.4 FL (ref 82–102)
MONOCYTES # BLD: 0.3 K/UL (ref 0.1–1.3)
MONOCYTES NFR BLD: 4 % (ref 4–12)
NEUTS SEG # BLD: 5.6 K/UL (ref 1.7–8.2)
NEUTS SEG NFR BLD: 76 % (ref 43–78)
NRBC # BLD: 0 K/UL (ref 0–0.2)
PLATELET # BLD AUTO: 138 K/UL (ref 150–450)
PMV BLD AUTO: 13.2 FL (ref 9.4–12.3)
RBC # BLD AUTO: 4.3 M/UL (ref 4.05–5.2)
T4 FREE SERPL-MCNC: 1 NG/DL (ref 0.78–1.46)
TSH, 3RD GENERATION: 0.54 UIU/ML (ref 0.36–3.74)
WBC # BLD AUTO: 7.5 K/UL (ref 4.3–11.1)

## 2024-03-01 NOTE — PROGRESS NOTES
Patient comes in today for routine prenatal visit. No complaints/concerns today.     Finished Glucola @ 10:43 am    Fetal Movement: Yes  Contractions: Yes, BH  Vaginal Bleeding: No  Leaking Fluid: No  GI/: No    Vitals:    03/01/24 1049   BP: 130/70   Site: Left Upper Arm   Position: Sitting   Weight: 85.7 kg (189 lb)   Height: 1.575 m (5' 2\")      
Overview Note:     Rhogam at 28 weeks (given 3/1/24) and PP if indicated         Assessment & Plan Note:     noted      Thyroid disease during pregnancy in third trimester 2019     Overview Note:     Seen by Dr Diamond in 2018 for Hashimoto's Thyroiditis not requiring treatment.     PLAN:  Labs q trimester - mgmt accordingly, serial growth in 3rd trimester         Assessment & Plan Note:     Labs today      Hashimoto's thyroiditis      Assessment & Plan Note:              Problem List Items Addressed This Visit              Multigravida in third trimester - Primary     Educated patient of signs and symptoms of  labor including but not limited to regular uterine contractions every 5-7 minutes for 1 hour, vaginal bleeding or leakage of fluid to seek immediate care.          Relevant Orders    CBC with Auto Differential    Glucose tolerance, 1 hour    TSH    T4, Free    History of polyhydramnios     noted         Short interval between pregnancies affecting pregnancy, antepartum     noted            Other    Hashimoto's thyroiditis               Rubella non-immune status, antepartum     noted         Thyroid disease during pregnancy in third trimester     Labs today         Relevant Orders    TSH    T4, Free    Rh negative state in antepartum period     noted         Hx of  delivery, currently pregnant, third trimester      noted             Raza Benedict MD     10:59 AM    24

## 2024-03-07 ENCOUNTER — TELEPHONE (OUTPATIENT)
Dept: OBGYN CLINIC | Age: 24
End: 2024-03-07

## 2024-03-07 ENCOUNTER — FOLLOWUP TELEPHONE ENCOUNTER (OUTPATIENT)
Dept: CASE MANAGEMENT | Age: 24
End: 2024-03-07

## 2024-03-07 NOTE — TELEPHONE ENCOUNTER
Request received from OB office to reach out to patient.    Phone call to patient at [phone].  Patient confirms that she would like to discuss possibly making an adoption plan for her .    SW provided education on types of adoptions (open - closed), how to initiate the adoption process, and answered patient's questions.      With patient's permission,  sent information on several local adoption agencies via text.    Patient agreeable for  to call back next week to check-in.  Additionally,  encouraged patient to reach out if any needs/questions arise.    BRYAN Campos-SHARI, [facility]-C  [facility]   [phone]

## 2024-03-07 NOTE — TELEPHONE ENCOUNTER
Called patient x2 on home and mobile, no answer on home, LVM with no details given and mobile phone number was not in service.     Sent [employer] message back to patient.     Sending information to Maura Jack .

## 2024-03-13 ENCOUNTER — FOLLOWUP TELEPHONE ENCOUNTER (OUTPATIENT)
Dept: CASE MANAGEMENT | Age: 24
End: 2024-03-13

## 2024-03-13 NOTE — TELEPHONE ENCOUNTER
Phone call to patient at [phone].     No answer; message left requesting call back.     BRYAN Campos-SHARI, [facility]-C  [facility]   [phone]

## 2024-03-13 NOTE — TELEPHONE ENCOUNTER
Text received from patient stating that she has decided not to pursue adoption.    SW encouraged patient to reach out if any other needs/questions arise.    Maura Jack, BRYAN-SHARI, [facility]-C  [facility]   [phone]

## 2024-03-14 ENCOUNTER — ROUTINE PRENATAL (OUTPATIENT)
Dept: OBGYN CLINIC | Age: 24
End: 2024-03-14
Payer: MEDICAID

## 2024-03-14 VITALS
SYSTOLIC BLOOD PRESSURE: 126 MMHG | DIASTOLIC BLOOD PRESSURE: 84 MMHG | WEIGHT: 188 LBS | BODY MASS INDEX: 34.6 KG/M2 | HEIGHT: 62 IN

## 2024-03-14 DIAGNOSIS — Z34.83 MULTIGRAVIDA IN THIRD TRIMESTER: Primary | ICD-10-CM

## 2024-03-14 DIAGNOSIS — O09.893 HX OF PRETERM DELIVERY, CURRENTLY PREGNANT, THIRD TRIMESTER: ICD-10-CM

## 2024-03-14 DIAGNOSIS — Z28.39 RUBELLA NON-IMMUNE STATUS, ANTEPARTUM: ICD-10-CM

## 2024-03-14 DIAGNOSIS — E07.9 THYROID DISEASE DURING PREGNANCY IN THIRD TRIMESTER: ICD-10-CM

## 2024-03-14 DIAGNOSIS — E06.3 HASHIMOTO'S THYROIDITIS: ICD-10-CM

## 2024-03-14 DIAGNOSIS — O09.899 SHORT INTERVAL BETWEEN PREGNANCIES AFFECTING PREGNANCY, ANTEPARTUM: ICD-10-CM

## 2024-03-14 DIAGNOSIS — Z67.91 RH NEGATIVE STATE IN ANTEPARTUM PERIOD: ICD-10-CM

## 2024-03-14 DIAGNOSIS — Z87.59 HISTORY OF POLYHYDRAMNIOS: ICD-10-CM

## 2024-03-14 DIAGNOSIS — O09.899 RUBELLA NON-IMMUNE STATUS, ANTEPARTUM: ICD-10-CM

## 2024-03-14 DIAGNOSIS — O26.899 RH NEGATIVE STATE IN ANTEPARTUM PERIOD: ICD-10-CM

## 2024-03-14 DIAGNOSIS — O99.283 THYROID DISEASE DURING PREGNANCY IN THIRD TRIMESTER: ICD-10-CM

## 2024-03-14 PROCEDURE — 99213 OFFICE O/P EST LOW 20 MIN: CPT | Performed by: OBSTETRICS & GYNECOLOGY

## 2024-03-14 NOTE — PROGRESS NOTES
Chief Complaint   Patient presents with    Routine Prenatal Visit        This 24 y.o.  at 31w6d with Estimated Date of Delivery: 5/10/24 presents for routine prenatal visit. Patient has no complaints today. Pt reports good FM, no LOF, VB, ctx. Pt denies H/A, vision changes, abdom pain, N/V.    Vitals:    24 0934   BP: 126/84   Site: Right Upper Arm   Position: Sitting   Weight: 85.3 kg (188 lb)   Height: 1.575 m (5' 2\")        Patient Active Problem List    Diagnosis Date Noted    Hx of  delivery, currently pregnant, third trimester 2023     Overview Note:     G4 - PPROM at 36.5  PLAN: serial cervical length    2023: CL 3 cm  2024: CL 4.9 cm       Assessment & Plan Note:     noted      Multigravida in third trimester 2023     Overview Note:     EDC by LMP confirmed by 15 1/7 week US    23:  NIPT low risk, FEMALE, SMA, CF, DMD, Fragile X all neg  2024: EFW 19%, AC 23%, anatomy nl, ZEINA nl, breech, CL 4.9 cm. Protein handout reviewed  3/1/24:  EFW 55%, AC 60%, ZEINA 19.8 cm, Breech       Assessment & Plan Note:     Educated patient of signs and symptoms of  labor including but not limited to regular uterine contractions every 5-7 minutes for 1 hour, vaginal bleeding or leakage of fluid to seek immediate care.       History of polyhydramnios 2023     Overview Note:     PLAN:  Serial ZEINA in 3rd trimester    3/1/24:  EFW 55%, AC 60%, ZEINA 19.8 cm, Breech       Assessment & Plan Note:     noted      Short interval between pregnancies affecting pregnancy, antepartum 2023     Overview Note:      in 2023    PLAN:  Baby ASA, serial growth in 3rd trimester    3/1/24:  EFW 55%, AC 60%, ZEINA 19.8 cm, Breech       Assessment & Plan Note:     noted      Rubella non-immune status, antepartum 2019     Overview Note:     imm PP         Assessment & Plan Note:      noted      Rh negative state in antepartum period 2019     Overview Note:

## 2024-03-14 NOTE — PROGRESS NOTES
Patient comes in today for routine prenatal visit. No complaints/concerns today.     Fetal Movement: Yes  Contractions: Yes not consistent   Vaginal Bleeding: No  Leaking Fluid: No  GI/: No    Vitals:    03/14/24 0934   BP: 126/84   Site: Right Upper Arm   Position: Sitting   Weight: 85.3 kg (188 lb)   Height: 1.575 m (5' 2\")

## 2024-03-28 ENCOUNTER — PROCEDURE VISIT (OUTPATIENT)
Dept: OBGYN CLINIC | Age: 24
End: 2024-03-28
Payer: MEDICAID

## 2024-03-28 ENCOUNTER — ROUTINE PRENATAL (OUTPATIENT)
Dept: OBGYN CLINIC | Age: 24
End: 2024-03-28
Payer: MEDICAID

## 2024-03-28 VITALS
DIASTOLIC BLOOD PRESSURE: 66 MMHG | WEIGHT: 189.2 LBS | SYSTOLIC BLOOD PRESSURE: 114 MMHG | HEIGHT: 62 IN | BODY MASS INDEX: 34.82 KG/M2

## 2024-03-28 DIAGNOSIS — Z67.91 RH NEGATIVE STATE IN ANTEPARTUM PERIOD: ICD-10-CM

## 2024-03-28 DIAGNOSIS — Z34.83 MULTIGRAVIDA IN THIRD TRIMESTER: ICD-10-CM

## 2024-03-28 DIAGNOSIS — E07.9 THYROID DISEASE DURING PREGNANCY IN THIRD TRIMESTER: Primary | ICD-10-CM

## 2024-03-28 DIAGNOSIS — Z28.39 RUBELLA NON-IMMUNE STATUS, ANTEPARTUM: ICD-10-CM

## 2024-03-28 DIAGNOSIS — O09.899 SHORT INTERVAL BETWEEN PREGNANCIES AFFECTING PREGNANCY, ANTEPARTUM: ICD-10-CM

## 2024-03-28 DIAGNOSIS — O09.899 RUBELLA NON-IMMUNE STATUS, ANTEPARTUM: ICD-10-CM

## 2024-03-28 DIAGNOSIS — Z87.59 HISTORY OF POLYHYDRAMNIOS: ICD-10-CM

## 2024-03-28 DIAGNOSIS — O26.843 UTERINE SIZE DATE DISCREPANCY PREGNANCY, THIRD TRIMESTER: Primary | ICD-10-CM

## 2024-03-28 DIAGNOSIS — E06.3 HASHIMOTO'S THYROIDITIS: ICD-10-CM

## 2024-03-28 DIAGNOSIS — O99.283 THYROID DISEASE DURING PREGNANCY IN THIRD TRIMESTER: Primary | ICD-10-CM

## 2024-03-28 DIAGNOSIS — O09.893 HX OF PRETERM DELIVERY, CURRENTLY PREGNANT, THIRD TRIMESTER: ICD-10-CM

## 2024-03-28 DIAGNOSIS — O26.899 RH NEGATIVE STATE IN ANTEPARTUM PERIOD: ICD-10-CM

## 2024-03-28 PROCEDURE — 76816 OB US FOLLOW-UP PER FETUS: CPT | Performed by: OBSTETRICS & GYNECOLOGY

## 2024-03-28 PROCEDURE — 99213 OFFICE O/P EST LOW 20 MIN: CPT | Performed by: OBSTETRICS & GYNECOLOGY

## 2024-03-28 SDOH — ECONOMIC STABILITY: FOOD INSECURITY: WITHIN THE PAST 12 MONTHS, THE FOOD YOU BOUGHT JUST DIDN'T LAST AND YOU DIDN'T HAVE MONEY TO GET MORE.: NEVER TRUE

## 2024-03-28 SDOH — ECONOMIC STABILITY: INCOME INSECURITY: HOW HARD IS IT FOR YOU TO PAY FOR THE VERY BASICS LIKE FOOD, HOUSING, MEDICAL CARE, AND HEATING?: NOT HARD AT ALL

## 2024-03-28 SDOH — ECONOMIC STABILITY: FOOD INSECURITY: WITHIN THE PAST 12 MONTHS, YOU WORRIED THAT YOUR FOOD WOULD RUN OUT BEFORE YOU GOT MONEY TO BUY MORE.: NEVER TRUE

## 2024-03-28 NOTE — PROGRESS NOTES
Chief Complaint   Patient presents with    Routine Prenatal Visit    Ultrasound        This 24 y.o.  at 33w6d with Estimated Date of Delivery: 5/10/24 presents for routine prenatal visit. Patient has no complaints today. Pt reports good FM, no LOF, VB, ctx. Pt denies H/A, vision changes, abdom pain, N/V.    Vitals:    24 1507   BP: 114/66   Site: Left Upper Arm   Position: Sitting   Weight: 85.8 kg (189 lb 3.2 oz)   Height: 1.575 m (5' 2\")        Patient Active Problem List    Diagnosis Date Noted    Hx of  delivery, currently pregnant, third trimester 2023     Overview Note:     G4 - PPROM at 36.5  PLAN: serial cervical length    2023: CL 3 cm  2024: CL 4.9 cm       Assessment & Plan Note:     noted      Multigravida in third trimester 2023     Overview Note:     EDC by LMP confirmed by 15 1/7 week US    23:  NIPT low risk, FEMALE, SMA, CF, DMD, Fragile X all neg  2024: EFW 19%, AC 23%, anatomy nl, ZEINA nl, breech, CL 4.9 cm. Protein handout reviewed  3/1/24:  EFW 55%, AC 60%, ZEINA 19.8 cm, Breech  3/14/24:  declines tdap  3/28/24:  EFW 38%, AC 46%, ZEINA 14.4 cm,vtx       Assessment & Plan Note:     Educated patient of signs and symptoms of  labor including but not limited to regular uterine contractions every 5-7 minutes for 1 hour, vaginal bleeding or leakage of fluid to seek immediate care.       History of polyhydramnios 2023     Overview Note:     PLAN:  Serial ZEINA in 3rd trimester    3/1/24:  EFW 55%, AC 60%, ZEINA 19.8 cm, Breech  3/28/24:  EFW 38%, AC 46%, ZEINA 14.4 cm,vtx       Assessment & Plan Note:     noted      Short interval between pregnancies affecting pregnancy, antepartum 2023     Overview Note:      in 2023    PLAN:  Baby ASA, serial growth in 3rd trimester    3/1/24:  EFW 55%, AC 60%, ZEINA 19.8 cm, Breech  3/28/24:  EFW 38%, AC 46%, ZEINA 14.4 cm,vtx       Assessment & Plan Note:     noted       Rubella non-immune status,

## 2024-03-28 NOTE — PROGRESS NOTES
Patient comes in today for routine prenatal visit. No complaints/concerns today.     Fetal Movement: Yes  Contractions: No  Vaginal Bleeding: No  Leaking Fluid: No  GI/: No    Vitals:    03/28/24 1507   BP: 114/66   Site: Left Upper Arm   Position: Sitting   Weight: 85.8 kg (189 lb 3.2 oz)   Height: 1.575 m (5' 2\")

## 2024-04-12 ENCOUNTER — ROUTINE PRENATAL (OUTPATIENT)
Dept: OBGYN CLINIC | Age: 24
End: 2024-04-12

## 2024-04-12 VITALS
HEIGHT: 62 IN | BODY MASS INDEX: 34.41 KG/M2 | SYSTOLIC BLOOD PRESSURE: 120 MMHG | WEIGHT: 187 LBS | DIASTOLIC BLOOD PRESSURE: 70 MMHG

## 2024-04-12 DIAGNOSIS — O09.899 RUBELLA NON-IMMUNE STATUS, ANTEPARTUM: ICD-10-CM

## 2024-04-12 DIAGNOSIS — O26.899 RH NEGATIVE STATE IN ANTEPARTUM PERIOD: ICD-10-CM

## 2024-04-12 DIAGNOSIS — Z28.39 RUBELLA NON-IMMUNE STATUS, ANTEPARTUM: ICD-10-CM

## 2024-04-12 DIAGNOSIS — Z67.91 RH NEGATIVE STATE IN ANTEPARTUM PERIOD: ICD-10-CM

## 2024-04-12 DIAGNOSIS — Z34.83 MULTIGRAVIDA IN THIRD TRIMESTER: Primary | ICD-10-CM

## 2024-04-12 DIAGNOSIS — Z87.59 HISTORY OF POLYHYDRAMNIOS: ICD-10-CM

## 2024-04-12 DIAGNOSIS — O09.893 HX OF PRETERM DELIVERY, CURRENTLY PREGNANT, THIRD TRIMESTER: ICD-10-CM

## 2024-04-12 DIAGNOSIS — O09.899 SHORT INTERVAL BETWEEN PREGNANCIES AFFECTING PREGNANCY, ANTEPARTUM: ICD-10-CM

## 2024-04-12 NOTE — PROGRESS NOTES
Chief Complaint   Patient presents with    Routine Prenatal Visit        This 24 y.o.  at 36w0d with Estimated Date of Delivery: 5/10/24 presents for routine prenatal visit. Patient has no complaints today. Pt reports good FM, no LOF, VB, ctx. Pt denies H/A, vision changes, abdom pain, N/V.    Vitals:    24 1123   BP: 120/70   Site: Right Upper Arm   Position: Sitting   Weight: 84.8 kg (187 lb)   Height: 1.575 m (5' 2\")        Patient Active Problem List    Diagnosis Date Noted    Hx of  delivery, currently pregnant, third trimester 2023     Overview Note:     G4 - PPROM at 36.5  PLAN: serial cervical length    2023: CL 3 cm  2024: CL 4.9 cm       Assessment & Plan Note:     noted      Multigravida in third trimester 2023     Overview Note:     EDC by LMP confirmed by 15 1/7 week US    23:  NIPT low risk, FEMALE, SMA, CF, DMD, Fragile X all neg  2024: EFW 19%, AC 23%, anatomy nl, ZEINA nl, breech, CL 4.9 cm. Protein handout reviewed  3/1/24:  EFW 55%, AC 60%, ZEINA 19.8 cm, Breech  3/14/24:  declines tdap  3/28/24:  EFW 38%, AC 46%, ZEINA 14.4 cm,vtx       Assessment & Plan Note:     Educated patient of signs and symptoms of  labor including but not limited to regular uterine contractions every 5-7 minutes for 1 hour, vaginal bleeding or leakage of fluid to seek immediate care.       History of polyhydramnios 2023     Overview Note:     PLAN:  Serial ZEINA in 3rd trimester    3/1/24:  EFW 55%, AC 60%, ZEINA 19.8 cm, Breech  3/28/24:  EFW 38%, AC 46%, ZEINA 14.4 cm,vtx       Assessment & Plan Note:      noted      Short interval between pregnancies affecting pregnancy, antepartum 2023     Overview Note:      in 2023    PLAN:  Baby ASA, serial growth in 3rd trimester    3/1/24:  EFW 55%, AC 60%, ZEINA 19.8 cm, Breech  3/28/24:  EFW 38%, AC 46%, ZEINA 14.4 cm,vtx       Assessment & Plan Note:      noted      Rubella non-immune status, antepartum

## 2024-04-14 LAB
BACTERIA SPEC CULT: NORMAL
SERVICE CMNT-IMP: NORMAL

## 2024-04-15 LAB
A VAGINAE DNA VAG QL NAA+PROBE: ABNORMAL SCORE
BACTERIA SPEC CULT: NORMAL
BVAB2 DNA VAG QL NAA+PROBE: ABNORMAL SCORE
C ALBICANS DNA VAG QL NAA+PROBE: NEGATIVE
C GLABRATA DNA VAG QL NAA+PROBE: NEGATIVE
C TRACH RRNA SPEC QL NAA+PROBE: NEGATIVE
MEGA1 DNA VAG QL NAA+PROBE: ABNORMAL SCORE
N GONORRHOEA RRNA SPEC QL NAA+PROBE: NEGATIVE
SERVICE CMNT-IMP: NORMAL
SPECIMEN SOURCE: ABNORMAL
T VAGINALIS RRNA SPEC QL NAA+PROBE: NEGATIVE

## 2024-04-19 ENCOUNTER — ROUTINE PRENATAL (OUTPATIENT)
Dept: OBGYN CLINIC | Age: 24
End: 2024-04-19

## 2024-04-19 VITALS
HEIGHT: 62 IN | BODY MASS INDEX: 34.23 KG/M2 | SYSTOLIC BLOOD PRESSURE: 122 MMHG | DIASTOLIC BLOOD PRESSURE: 80 MMHG | WEIGHT: 186 LBS

## 2024-04-19 DIAGNOSIS — Z67.91 RH NEGATIVE STATE IN ANTEPARTUM PERIOD: ICD-10-CM

## 2024-04-19 DIAGNOSIS — Z28.39 RUBELLA NON-IMMUNE STATUS, ANTEPARTUM: ICD-10-CM

## 2024-04-19 DIAGNOSIS — O09.899 RUBELLA NON-IMMUNE STATUS, ANTEPARTUM: ICD-10-CM

## 2024-04-19 DIAGNOSIS — O26.899 RH NEGATIVE STATE IN ANTEPARTUM PERIOD: ICD-10-CM

## 2024-04-19 DIAGNOSIS — E07.9 THYROID DISEASE DURING PREGNANCY IN THIRD TRIMESTER: ICD-10-CM

## 2024-04-19 DIAGNOSIS — O99.283 THYROID DISEASE DURING PREGNANCY IN THIRD TRIMESTER: ICD-10-CM

## 2024-04-19 DIAGNOSIS — O09.893 HX OF PRETERM DELIVERY, CURRENTLY PREGNANT, THIRD TRIMESTER: ICD-10-CM

## 2024-04-19 DIAGNOSIS — O09.899 SHORT INTERVAL BETWEEN PREGNANCIES AFFECTING PREGNANCY, ANTEPARTUM: ICD-10-CM

## 2024-04-19 DIAGNOSIS — Z87.59 HISTORY OF POLYHYDRAMNIOS: ICD-10-CM

## 2024-04-19 DIAGNOSIS — Z34.83 MULTIGRAVIDA IN THIRD TRIMESTER: Primary | ICD-10-CM

## 2024-04-19 DIAGNOSIS — E06.3 HASHIMOTO'S THYROIDITIS: ICD-10-CM

## 2024-04-19 NOTE — PROGRESS NOTES
Patient comes in today for routine prenatal visit. No complaints/concerns today.     Fetal Movement: Yes  Contractions: Yes  Vaginal Bleeding: No  Leaking Fluid: No  GI/: No    Vitals:    04/19/24 1105   BP: 122/80   Site: Left Upper Arm   Position: Sitting   Weight: 84.4 kg (186 lb)   Height: 1.575 m (5' 2\")        
Overview Note:     imm PP         Assessment & Plan Note:     noted      Rh negative state in antepartum period 2019     Overview Note:     Rhogam at 28 weeks (given 3/1/24) and PP if indicated         Assessment & Plan Note:     noted      Thyroid disease during pregnancy in third trimester 2019     Overview Note:     Seen by Dr Diamond in 2018 for Hashimoto's Thyroiditis not requiring treatment.     PLAN:  Labs q trimester - mgmt accordingly, serial growth in 3rd trimester    3/1/24:  labs wnl  3/28/24:  EFW 38%, AC 46%, ZEINA 14.4 cm,vtx       Assessment & Plan Note:     noted      Hashimoto's thyroiditis      Assessment & Plan Note:              Problem List Items Addressed This Visit              Short interval between pregnancies affecting pregnancy, antepartum     noted         Multigravida in third trimester - Primary     Educated patient of signs and symptoms of labor including but not limited to regular uterine contractions every 5-7 minutes for 1 hour, vaginal bleeding or leakage of fluid to seek immediate care.          History of polyhydramnios     noted            Other    Thyroid disease during pregnancy in third trimester     noted         Rubella non-immune status, antepartum     noted         Rh negative state in antepartum period     noted         Hx of  delivery, currently pregnant, third trimester     noted         Hashimoto's thyroiditis                   Raza Benedict MD   11:11 AM  24

## 2024-04-24 ENCOUNTER — HOSPITAL ENCOUNTER (OUTPATIENT)
Age: 24
Discharge: HOME | End: 2024-04-24
Attending: OBSTETRICS & GYNECOLOGY | Admitting: OBSTETRICS & GYNECOLOGY
Payer: MEDICAID

## 2024-04-24 VITALS
SYSTOLIC BLOOD PRESSURE: 109 MMHG | TEMPERATURE: 100.3 F | DIASTOLIC BLOOD PRESSURE: 57 MMHG | HEART RATE: 113 BPM | OXYGEN SATURATION: 100 %

## 2024-04-24 PROBLEM — D69.6 THROMBOCYTOPENIA AFFECTING PREGNANCY (HCC): Status: ACTIVE | Noted: 2024-04-24

## 2024-04-24 PROBLEM — O99.119 THROMBOCYTOPENIA AFFECTING PREGNANCY (HCC): Status: ACTIVE | Noted: 2024-04-24

## 2024-04-24 PROBLEM — R19.7 DIARRHEA: Status: ACTIVE | Noted: 2024-04-24

## 2024-04-24 LAB
ALBUMIN SERPL-MCNC: 2 G/DL (ref 3.5–5)
ALBUMIN/GLOB SERPL: 0.6 (ref 1–1.9)
ALP SERPL-CCNC: 109 U/L (ref 35–104)
ALT SERPL-CCNC: <5 U/L (ref 12–65)
ANION GAP SERPL CALC-SCNC: 13 MMOL/L (ref 9–18)
AST SERPL-CCNC: 13 U/L (ref 15–37)
BILIRUB SERPL-MCNC: 0.5 MG/DL (ref 0–1.2)
BUN SERPL-MCNC: 4 MG/DL (ref 6–23)
CALCIUM SERPL-MCNC: 8.2 MG/DL (ref 8.8–10.2)
CHLORIDE SERPL-SCNC: 104 MMOL/L (ref 98–107)
CO2 SERPL-SCNC: 18 MMOL/L (ref 20–28)
CREAT SERPL-MCNC: 0.44 MG/DL (ref 0.6–1.1)
ERYTHROCYTE [DISTWIDTH] IN BLOOD BY AUTOMATED COUNT: 15.6 % (ref 11.9–14.6)
ERYTHROCYTE [DISTWIDTH] IN BLOOD BY AUTOMATED COUNT: 15.7 % (ref 11.9–14.6)
GLOBULIN SER CALC-MCNC: 3.5 G/DL (ref 2.3–3.5)
GLUCOSE SERPL-MCNC: 90 MG/DL (ref 70–99)
HCT VFR BLD AUTO: 32.3 % (ref 35.8–46.3)
HCT VFR BLD AUTO: 34.2 % (ref 35.8–46.3)
HGB BLD-MCNC: 10.1 G/DL (ref 11.7–15.4)
HGB BLD-MCNC: 10.7 G/DL (ref 11.7–15.4)
MCH RBC QN AUTO: 24.8 PG (ref 26.1–32.9)
MCH RBC QN AUTO: 25 PG (ref 26.1–32.9)
MCHC RBC AUTO-ENTMCNC: 31.3 G/DL (ref 31.4–35)
MCHC RBC AUTO-ENTMCNC: 31.3 G/DL (ref 31.4–35)
MCV RBC AUTO: 79.2 FL (ref 82–102)
MCV RBC AUTO: 79.9 FL (ref 82–102)
NRBC # BLD: 0 K/UL (ref 0–0.2)
NRBC # BLD: 0 K/UL (ref 0–0.2)
PLATELET # BLD AUTO: 105 K/UL (ref 150–450)
PLATELET # BLD AUTO: 98 K/UL (ref 150–450)
PMV BLD AUTO: 11.9 FL (ref 9.4–12.3)
PMV BLD AUTO: ABNORMAL FL (ref 9.4–12.3)
POTASSIUM SERPL-SCNC: 3.6 MMOL/L (ref 3.5–5.1)
PROT SERPL-MCNC: 5.5 G/DL (ref 6.3–8.2)
RBC # BLD AUTO: 4.08 M/UL (ref 4.05–5.2)
RBC # BLD AUTO: 4.28 M/UL (ref 4.05–5.2)
SARS-COV-2 RDRP RESP QL NAA+PROBE: NOT DETECTED
SODIUM SERPL-SCNC: 135 MMOL/L (ref 136–145)
SOURCE: NORMAL
WBC # BLD AUTO: 5.6 K/UL (ref 4.3–11.1)
WBC # BLD AUTO: 5.6 K/UL (ref 4.3–11.1)

## 2024-04-24 PROCEDURE — 2580000003 HC RX 258: Performed by: OBSTETRICS & GYNECOLOGY

## 2024-04-24 PROCEDURE — 87635 SARS-COV-2 COVID-19 AMP PRB: CPT

## 2024-04-24 PROCEDURE — 80053 COMPREHEN METABOLIC PANEL: CPT

## 2024-04-24 PROCEDURE — 6370000000 HC RX 637 (ALT 250 FOR IP): Performed by: OBSTETRICS & GYNECOLOGY

## 2024-04-24 PROCEDURE — 85027 COMPLETE CBC AUTOMATED: CPT

## 2024-04-24 PROCEDURE — 6360000002 HC RX W HCPCS: Performed by: OBSTETRICS & GYNECOLOGY

## 2024-04-24 PROCEDURE — 99285 EMERGENCY DEPT VISIT HI MDM: CPT

## 2024-04-24 RX ORDER — ACETAMINOPHEN 500 MG
1000 TABLET ORAL ONCE
Status: COMPLETED | OUTPATIENT
Start: 2024-04-24 | End: 2024-04-24

## 2024-04-24 RX ORDER — SODIUM CHLORIDE, SODIUM LACTATE, POTASSIUM CHLORIDE, AND CALCIUM CHLORIDE .6; .31; .03; .02 G/100ML; G/100ML; G/100ML; G/100ML
1000 INJECTION, SOLUTION INTRAVENOUS ONCE
Status: COMPLETED | OUTPATIENT
Start: 2024-04-24 | End: 2024-04-24

## 2024-04-24 RX ORDER — ONDANSETRON 2 MG/ML
4 INJECTION INTRAMUSCULAR; INTRAVENOUS ONCE
Status: COMPLETED | OUTPATIENT
Start: 2024-04-24 | End: 2024-04-24

## 2024-04-24 RX ADMIN — ACETAMINOPHEN 1000 MG: 500 TABLET, FILM COATED ORAL at 18:11

## 2024-04-24 RX ADMIN — ONDANSETRON 4 MG: 2 INJECTION INTRAMUSCULAR; INTRAVENOUS at 17:24

## 2024-04-24 RX ADMIN — SODIUM CHLORIDE, POTASSIUM CHLORIDE, SODIUM LACTATE AND CALCIUM CHLORIDE 1000 ML: 600; 310; 30; 20 INJECTION, SOLUTION INTRAVENOUS at 16:39

## 2024-04-24 ASSESSMENT — PAIN SCALES - GENERAL: PAINLEVEL_OUTOF10: 0

## 2024-04-24 NOTE — H&P
4 mg IV x 1  - CBC, CMP, and Covid testing obtained and normal with exception of low platelets (105--->98 K with repeat lab draw)  - Tylenol 1000 mg x 1 given for low grade temp of 100.3.  May repeat Tylenol 1000 mg every 6 hours prn  - FHR tracing reviewed with initial concern for fetal tachycardia (highest 170-180)---> resolved to baseline of 160s after Tylenol & IV fluid administration.  - Discussed with patient option of overnight observation for ongoing IV hydration but she does feel improved and would like to go home.  I suspect she has viral gastroenteritis since FOB had similar symptoms earlier this week and we discussed that her current symptoms should improve over 24-48 hours. If she is not seeing improvement and/or any new/ worsening symptoms, she is aware of need to return to triage dept for further evaluation.  - Note provided for no work today through 4/25.  May return on 4/26 provided she is feeling better.   - Also discussed low platelets on labs today with need for repeat labs next appt or within 1 week (see OB problem list)  - BRAT diet---> advance as tolerated  - Imodium OTC prn diarrhea      Labor precautions given.  Patient was told to return to LD immediately if she experiences contractions in a pattern, loss of fluids, vaginal bleeding or decreased Fetal movement.

## 2024-04-24 NOTE — PROGRESS NOTES
Patient discharged home with labor precautions. Patient also informed if not any better in 1-2 days notify MD. Patient given discharge information and verbalized understanding. No further needs or questions noted at time of discharge. Patient left unit walking with two small children and significant other.

## 2024-04-24 NOTE — PROGRESS NOTES
Patient arrived to OB triage for c/o diarrhea that started yesterday. Denies LOF, (+)FM, denies contractions and vaginal bleeding. Dr. Simpson called and made aware of patients arrival and CC. MD coming to assess.

## 2024-04-26 ENCOUNTER — ROUTINE PRENATAL (OUTPATIENT)
Dept: OBGYN CLINIC | Age: 24
End: 2024-04-26
Payer: MEDICAID

## 2024-04-26 ENCOUNTER — PROCEDURE VISIT (OUTPATIENT)
Dept: OBGYN CLINIC | Age: 24
End: 2024-04-26
Payer: MEDICAID

## 2024-04-26 VITALS
DIASTOLIC BLOOD PRESSURE: 80 MMHG | SYSTOLIC BLOOD PRESSURE: 124 MMHG | WEIGHT: 187.4 LBS | BODY MASS INDEX: 34.48 KG/M2 | HEIGHT: 62 IN

## 2024-04-26 DIAGNOSIS — E06.3 HASHIMOTO'S THYROIDITIS: ICD-10-CM

## 2024-04-26 DIAGNOSIS — Z34.83 MULTIGRAVIDA IN THIRD TRIMESTER: ICD-10-CM

## 2024-04-26 DIAGNOSIS — O99.119 THROMBOCYTOPENIA AFFECTING PREGNANCY (HCC): Primary | ICD-10-CM

## 2024-04-26 DIAGNOSIS — O26.899 RH NEGATIVE STATE IN ANTEPARTUM PERIOD: ICD-10-CM

## 2024-04-26 DIAGNOSIS — D69.6 THROMBOCYTOPENIA AFFECTING PREGNANCY (HCC): Primary | ICD-10-CM

## 2024-04-26 DIAGNOSIS — Z28.39 RUBELLA NON-IMMUNE STATUS, ANTEPARTUM: ICD-10-CM

## 2024-04-26 DIAGNOSIS — E07.9 THYROID DISEASE DURING PREGNANCY IN THIRD TRIMESTER: ICD-10-CM

## 2024-04-26 DIAGNOSIS — O99.119 THROMBOCYTOPENIA AFFECTING PREGNANCY (HCC): ICD-10-CM

## 2024-04-26 DIAGNOSIS — O09.899 RUBELLA NON-IMMUNE STATUS, ANTEPARTUM: ICD-10-CM

## 2024-04-26 DIAGNOSIS — Z87.59 HISTORY OF POLYHYDRAMNIOS: ICD-10-CM

## 2024-04-26 DIAGNOSIS — Z67.91 RH NEGATIVE STATE IN ANTEPARTUM PERIOD: ICD-10-CM

## 2024-04-26 DIAGNOSIS — O09.893 HX OF PRETERM DELIVERY, CURRENTLY PREGNANT, THIRD TRIMESTER: ICD-10-CM

## 2024-04-26 DIAGNOSIS — D69.6 THROMBOCYTOPENIA AFFECTING PREGNANCY (HCC): ICD-10-CM

## 2024-04-26 DIAGNOSIS — O99.283 THYROID DISEASE DURING PREGNANCY IN THIRD TRIMESTER: ICD-10-CM

## 2024-04-26 DIAGNOSIS — O09.899 SHORT INTERVAL BETWEEN PREGNANCIES AFFECTING PREGNANCY, ANTEPARTUM: ICD-10-CM

## 2024-04-26 DIAGNOSIS — O09.899 SHORT INTERVAL BETWEEN PREGNANCIES AFFECTING PREGNANCY, ANTEPARTUM: Primary | ICD-10-CM

## 2024-04-26 LAB
ERYTHROCYTE [DISTWIDTH] IN BLOOD BY AUTOMATED COUNT: 15.9 % (ref 11.9–14.6)
HCT VFR BLD AUTO: 33.3 % (ref 35.8–46.3)
HGB BLD-MCNC: 10.4 G/DL (ref 11.7–15.4)
MCH RBC QN AUTO: 25.4 PG (ref 26.1–32.9)
MCHC RBC AUTO-ENTMCNC: 31.2 G/DL (ref 31.4–35)
MCV RBC AUTO: 81.4 FL (ref 82–102)
NRBC # BLD: 0 K/UL (ref 0–0.2)
PLATELET # BLD AUTO: 120 K/UL (ref 150–450)
PMV BLD AUTO: 13.2 FL (ref 9.4–12.3)
RBC # BLD AUTO: 4.09 M/UL (ref 4.05–5.2)
T PALLIDUM AB SER QL IA: NONREACTIVE
WBC # BLD AUTO: 4.9 K/UL (ref 4.3–11.1)

## 2024-04-26 PROCEDURE — 99213 OFFICE O/P EST LOW 20 MIN: CPT | Performed by: NURSE PRACTITIONER

## 2024-04-26 PROCEDURE — 76816 OB US FOLLOW-UP PER FETUS: CPT | Performed by: OBSTETRICS & GYNECOLOGY

## 2024-04-26 NOTE — PROGRESS NOTES
This is a 24 y.o.   at 38w0d for routine OB visit.    Her Estimated Due Date is 5/10/2024, by Last Menstrual Period    Denies leaking of fluid, vaginal bleeding, or regular contractions. Reports fetal movement.     Pt seen in triage 2 days ago with suspected gastroenteritis. Feeling better. Platelets were found to be 98K    Current Outpatient Medications on File Prior to Visit   Medication Sig Dispense Refill    Prenatal Vit-Fe Fumarate-FA (PRENATAL VITAMINS) 28-0.8 MG TABS Take 1 tablet by mouth daily 90 tablet 3     No current facility-administered medications on file prior to visit.       No Known Allergies    OB History    Para Term  AB Living   5 2 1 1 2 2   SAB IAB Ectopic Molar Multiple Live Births   2 0 0 0 0 2       # 1 - Date: None, Sex: None, Weight: None, GA: None, Delivery: None, Apgar1: None, Apgar5: None, Living: None, Birth Comments: None    # 2 - Date: 2019, Sex: None, Weight: None, GA: 14w0d, Delivery: None, Apgar1: None, Apgar5: None, Living: None, Birth Comments: None    # 3 - Date: 20, Sex: Female, Weight: 3.05 kg (6 lb 11.6 oz), GA: 39w2d, Delivery: Vaginal, Spontaneous, Apgar1: 7, Apgar5: 9, Living: Living, Birth Comments: None    # 4 - Date: 23, Sex: Male, Weight: 2.86 kg (6 lb 4.9 oz), GA: 36w5d, Delivery: Vaginal, Spontaneous, Apgar1: 8, Apgar5: 9, Living: Living, Birth Comments: None    # 5 - Date: None, Sex: None, Weight: None, GA: None, Delivery: None, Apgar1: None, Apgar5: None, Living: None, Birth Comments: None        Past Medical History:   Diagnosis Date    Anemia affecting pregnancy in third trimester 2023    Hashimoto's thyroiditis        Past Surgical History:   Procedure Laterality Date    DILATION AND CURETTAGE      DILATION AND CURETTAGE OF UTERUS  2019       Family History   Problem Relation Age of Onset    Uterine Cancer Neg Hx     Thyroid Disease Paternal Grandmother         hyperthyroidism?    Thyroid Disease Sister

## 2024-04-26 NOTE — PROGRESS NOTES
Chaperone for Intimate Exam     Chaperone was offer accepted as part of the rooming process    Chaperone: Lily Rea

## 2024-04-26 NOTE — PROGRESS NOTES
Patient comes in today for routine prenatal visit. No complaints/concerns today.     Fetal Movement: Yes  Contractions: Yes-BH  Vaginal Bleeding: No  Leaking Fluid: No  GI/: No    Vitals:    04/26/24 1001   BP: 124/80   Site: Left Upper Arm   Position: Sitting   Weight: 85 kg (187 lb 6.4 oz)   Height: 1.575 m (5' 2\")

## 2024-04-26 NOTE — ASSESSMENT & PLAN NOTE
PTL/labor precautions, FMC, and pregnancy warning signs reviewed. Pt advised to call the office at [phone] or go straight to Labor and Delivery at [facility] with any of the following concerns vaginal bleeding, leaking of fluid, jer regularly Q 5-7 minutes for over an hour or not feeling the baby move.   RTO 1 wk OBV

## 2024-04-26 NOTE — ASSESSMENT & PLAN NOTE
Recheck today  We discuss possible anesthesia consult and  tx with corticosteroids if needed  Pt does desire epidural

## 2024-04-27 DIAGNOSIS — O99.119 THROMBOCYTOPENIA AFFECTING PREGNANCY (HCC): Primary | ICD-10-CM

## 2024-04-27 DIAGNOSIS — D69.6 THROMBOCYTOPENIA AFFECTING PREGNANCY (HCC): Primary | ICD-10-CM

## 2024-04-27 NOTE — TELEPHONE ENCOUNTER
Please let pt know her platelet level has improved to 120K    2. hemoglobin was low. She needs to start taking iron if not doing so already    FeSO4 325mg PO once daily Disp: 30 RF:6  Colace 100mg PO BID Disp: 60 RF: 6 prn for constipation    Also encourage foods that are high in iron. Also increase fluids and foods high in fiber to prevent constipation.

## 2024-04-29 RX ORDER — FERROUS SULFATE 325(65) MG
325 TABLET ORAL DAILY
Qty: 30 TABLET | Refills: 6 | Status: SHIPPED | OUTPATIENT
Start: 2024-04-29

## 2024-04-29 RX ORDER — DOCUSATE SODIUM 100 MG/1
100 CAPSULE, LIQUID FILLED ORAL 2 TIMES DAILY PRN
Qty: 60 CAPSULE | Refills: 6 | Status: ON HOLD | OUTPATIENT
Start: 2024-04-29 | End: 2024-05-04 | Stop reason: HOSPADM

## 2024-05-03 ENCOUNTER — ANESTHESIA EVENT (OUTPATIENT)
Dept: LABOR AND DELIVERY | Age: 24
End: 2024-05-03
Payer: MEDICAID

## 2024-05-03 ENCOUNTER — HOSPITAL ENCOUNTER (INPATIENT)
Age: 24
LOS: 2 days | Discharge: HOME | DRG: 560 | End: 2024-05-05
Attending: OBSTETRICS & GYNECOLOGY | Admitting: OBSTETRICS & GYNECOLOGY
Payer: MEDICAID

## 2024-05-03 ENCOUNTER — ANESTHESIA (OUTPATIENT)
Dept: LABOR AND DELIVERY | Age: 24
End: 2024-05-03
Payer: MEDICAID

## 2024-05-03 PROBLEM — Z37.9 NORMAL LABOR: Status: ACTIVE | Noted: 2024-05-03

## 2024-05-03 LAB
ABO + RH BLD: NORMAL
BLOOD GROUP ANTIBODIES SERPL: NORMAL
ERYTHROCYTE [DISTWIDTH] IN BLOOD BY AUTOMATED COUNT: 16.3 % (ref 11.9–14.6)
HCT VFR BLD AUTO: 37.4 % (ref 35.8–46.3)
HGB BLD-MCNC: 11.1 G/DL (ref 11.7–15.4)
MCH RBC QN AUTO: 24.8 PG (ref 26.1–32.9)
MCHC RBC AUTO-ENTMCNC: 29.7 G/DL (ref 31.4–35)
MCV RBC AUTO: 83.5 FL (ref 82–102)
NRBC # BLD: 0 K/UL (ref 0–0.2)
PLATELET # BLD AUTO: 103 K/UL (ref 150–450)
PMV BLD AUTO: 12.9 FL (ref 9.4–12.3)
RBC # BLD AUTO: 4.48 M/UL (ref 4.05–5.2)
SPECIMEN EXP DATE BLD: NORMAL
T PALLIDUM AB SER QL IA: NONREACTIVE
WBC # BLD AUTO: 10 K/UL (ref 4.3–11.1)

## 2024-05-03 PROCEDURE — 86900 BLOOD TYPING SEROLOGIC ABO: CPT

## 2024-05-03 PROCEDURE — 59409 OBSTETRICAL CARE: CPT | Performed by: OBSTETRICS & GYNECOLOGY

## 2024-05-03 PROCEDURE — 4A1HXCZ MONITORING OF PRODUCTS OF CONCEPTION, CARDIAC RATE, EXTERNAL APPROACH: ICD-10-PCS | Performed by: OBSTETRICS & GYNECOLOGY

## 2024-05-03 PROCEDURE — 6360000002 HC RX W HCPCS: Performed by: OBSTETRICS & GYNECOLOGY

## 2024-05-03 PROCEDURE — 2580000003 HC RX 258

## 2024-05-03 PROCEDURE — 99285 EMERGENCY DEPT VISIT HI MDM: CPT

## 2024-05-03 PROCEDURE — 0HQ9XZZ REPAIR PERINEUM SKIN, EXTERNAL APPROACH: ICD-10-PCS | Performed by: OBSTETRICS & GYNECOLOGY

## 2024-05-03 PROCEDURE — 51701 INSERT BLADDER CATHETER: CPT

## 2024-05-03 PROCEDURE — 7100000010 HC PHASE II RECOVERY - FIRST 15 MIN

## 2024-05-03 PROCEDURE — 6370000000 HC RX 637 (ALT 250 FOR IP): Performed by: OBSTETRICS & GYNECOLOGY

## 2024-05-03 PROCEDURE — 2580000003 HC RX 258: Performed by: OBSTETRICS & GYNECOLOGY

## 2024-05-03 PROCEDURE — 7220000101 HC DELIVERY VAGINAL/SINGLE

## 2024-05-03 PROCEDURE — 86901 BLOOD TYPING SEROLOGIC RH(D): CPT

## 2024-05-03 PROCEDURE — 6360000002 HC RX W HCPCS

## 2024-05-03 PROCEDURE — 6360000002 HC RX W HCPCS: Performed by: NURSE ANESTHETIST, CERTIFIED REGISTERED

## 2024-05-03 PROCEDURE — 86850 RBC ANTIBODY SCREEN: CPT

## 2024-05-03 PROCEDURE — 7100000011 HC PHASE II RECOVERY - ADDTL 15 MIN

## 2024-05-03 PROCEDURE — 86780 TREPONEMA PALLIDUM: CPT

## 2024-05-03 PROCEDURE — 7210000100 HC LABOR FEE PER 1 HR

## 2024-05-03 PROCEDURE — 1100000000 HC RM PRIVATE

## 2024-05-03 PROCEDURE — 85027 COMPLETE CBC AUTOMATED: CPT

## 2024-05-03 PROCEDURE — 62325 NJX INTERLAMINAR CRV/THRC: CPT | Performed by: ANESTHESIOLOGY

## 2024-05-03 RX ORDER — MISOPROSTOL 200 UG/1
400 TABLET ORAL PRN
Status: DISCONTINUED | OUTPATIENT
Start: 2024-05-03 | End: 2024-05-03 | Stop reason: SDUPTHER

## 2024-05-03 RX ORDER — ACETAMINOPHEN 500 MG
1000 TABLET ORAL EVERY 8 HOURS SCHEDULED
Status: DISCONTINUED | OUTPATIENT
Start: 2024-05-03 | End: 2024-05-05 | Stop reason: HOSPADM

## 2024-05-03 RX ORDER — SODIUM CHLORIDE 0.9 % (FLUSH) 0.9 %
5-40 SYRINGE (ML) INJECTION EVERY 12 HOURS SCHEDULED
Status: DISCONTINUED | OUTPATIENT
Start: 2024-05-03 | End: 2024-05-03

## 2024-05-03 RX ORDER — SODIUM CHLORIDE, SODIUM LACTATE, POTASSIUM CHLORIDE, AND CALCIUM CHLORIDE .6; .31; .03; .02 G/100ML; G/100ML; G/100ML; G/100ML
1000 INJECTION, SOLUTION INTRAVENOUS PRN
Status: DISCONTINUED | OUTPATIENT
Start: 2024-05-03 | End: 2024-05-03

## 2024-05-03 RX ORDER — SODIUM CHLORIDE 0.9 % (FLUSH) 0.9 %
5-40 SYRINGE (ML) INJECTION PRN
Status: DISCONTINUED | OUTPATIENT
Start: 2024-05-03 | End: 2024-05-03

## 2024-05-03 RX ORDER — METHYLERGONOVINE MALEATE 0.2 MG/ML
200 INJECTION INTRAVENOUS PRN
Status: DISCONTINUED | OUTPATIENT
Start: 2024-05-03 | End: 2024-05-03 | Stop reason: SDUPTHER

## 2024-05-03 RX ORDER — ONDANSETRON 8 MG/1
8 TABLET, ORALLY DISINTEGRATING ORAL EVERY 8 HOURS PRN
Status: DISCONTINUED | OUTPATIENT
Start: 2024-05-03 | End: 2024-05-05 | Stop reason: HOSPADM

## 2024-05-03 RX ORDER — METHYLERGONOVINE MALEATE 0.2 MG/ML
200 INJECTION INTRAVENOUS PRN
Status: DISCONTINUED | OUTPATIENT
Start: 2024-05-03 | End: 2024-05-05 | Stop reason: HOSPADM

## 2024-05-03 RX ORDER — FAMOTIDINE 20 MG/1
20 TABLET, FILM COATED ORAL 2 TIMES DAILY PRN
Status: DISCONTINUED | OUTPATIENT
Start: 2024-05-03 | End: 2024-05-05 | Stop reason: HOSPADM

## 2024-05-03 RX ORDER — DEXTROSE, SODIUM CHLORIDE, SODIUM LACTATE, POTASSIUM CHLORIDE, AND CALCIUM CHLORIDE 5; .6; .31; .03; .02 G/100ML; G/100ML; G/100ML; G/100ML; G/100ML
INJECTION, SOLUTION INTRAVENOUS CONTINUOUS
Status: DISCONTINUED | OUTPATIENT
Start: 2024-05-03 | End: 2024-05-03

## 2024-05-03 RX ORDER — TRANEXAMIC ACID 10 MG/ML
1000 INJECTION, SOLUTION INTRAVENOUS
Status: DISCONTINUED | OUTPATIENT
Start: 2024-05-03 | End: 2024-05-03

## 2024-05-03 RX ORDER — MISOPROSTOL 200 UG/1
200 TABLET ORAL PRN
Status: DISCONTINUED | OUTPATIENT
Start: 2024-05-03 | End: 2024-05-05 | Stop reason: HOSPADM

## 2024-05-03 RX ORDER — SODIUM CHLORIDE 0.9 % (FLUSH) 0.9 %
5-40 SYRINGE (ML) INJECTION PRN
Status: DISCONTINUED | OUTPATIENT
Start: 2024-05-03 | End: 2024-05-05 | Stop reason: HOSPADM

## 2024-05-03 RX ORDER — OXYCODONE HYDROCHLORIDE 5 MG/1
5 TABLET ORAL EVERY 4 HOURS PRN
Status: DISCONTINUED | OUTPATIENT
Start: 2024-05-03 | End: 2024-05-05 | Stop reason: HOSPADM

## 2024-05-03 RX ORDER — ROPIVACAINE HYDROCHLORIDE 2 MG/ML
INJECTION, SOLUTION EPIDURAL; INFILTRATION; PERINEURAL PRN
Status: DISCONTINUED | OUTPATIENT
Start: 2024-05-03 | End: 2024-05-03 | Stop reason: SDUPTHER

## 2024-05-03 RX ORDER — CARBOPROST TROMETHAMINE 250 UG/ML
250 INJECTION, SOLUTION INTRAMUSCULAR PRN
Status: DISCONTINUED | OUTPATIENT
Start: 2024-05-03 | End: 2024-05-03

## 2024-05-03 RX ORDER — TERBUTALINE SULFATE 1 MG/ML
0.25 INJECTION, SOLUTION SUBCUTANEOUS
Status: DISCONTINUED | OUTPATIENT
Start: 2024-05-03 | End: 2024-05-03

## 2024-05-03 RX ORDER — DOCUSATE SODIUM 100 MG/1
100 CAPSULE, LIQUID FILLED ORAL 2 TIMES DAILY
Status: DISCONTINUED | OUTPATIENT
Start: 2024-05-03 | End: 2024-05-05 | Stop reason: HOSPADM

## 2024-05-03 RX ORDER — LANOLIN
CREAM (ML) TOPICAL PRN
Status: DISCONTINUED | OUTPATIENT
Start: 2024-05-03 | End: 2024-05-05 | Stop reason: HOSPADM

## 2024-05-03 RX ORDER — ONDANSETRON 4 MG/1
4 TABLET, ORALLY DISINTEGRATING ORAL EVERY 6 HOURS PRN
Status: DISCONTINUED | OUTPATIENT
Start: 2024-05-03 | End: 2024-05-03 | Stop reason: SDUPTHER

## 2024-05-03 RX ORDER — IBUPROFEN 800 MG/1
800 TABLET ORAL EVERY 8 HOURS SCHEDULED
Status: DISCONTINUED | OUTPATIENT
Start: 2024-05-03 | End: 2024-05-03

## 2024-05-03 RX ORDER — IBUPROFEN 800 MG/1
800 TABLET ORAL EVERY 8 HOURS SCHEDULED
Status: DISCONTINUED | OUTPATIENT
Start: 2024-05-03 | End: 2024-05-05 | Stop reason: HOSPADM

## 2024-05-03 RX ORDER — SODIUM CHLORIDE 9 MG/ML
25 INJECTION, SOLUTION INTRAVENOUS PRN
Status: DISCONTINUED | OUTPATIENT
Start: 2024-05-03 | End: 2024-05-03

## 2024-05-03 RX ORDER — SODIUM CHLORIDE, SODIUM LACTATE, POTASSIUM CHLORIDE, CALCIUM CHLORIDE 600; 310; 30; 20 MG/100ML; MG/100ML; MG/100ML; MG/100ML
INJECTION, SOLUTION INTRAVENOUS CONTINUOUS
Status: DISCONTINUED | OUTPATIENT
Start: 2024-05-03 | End: 2024-05-03

## 2024-05-03 RX ORDER — SODIUM CHLORIDE, SODIUM LACTATE, POTASSIUM CHLORIDE, AND CALCIUM CHLORIDE .6; .31; .03; .02 G/100ML; G/100ML; G/100ML; G/100ML
500 INJECTION, SOLUTION INTRAVENOUS PRN
Status: DISCONTINUED | OUTPATIENT
Start: 2024-05-03 | End: 2024-05-03

## 2024-05-03 RX ORDER — ONDANSETRON 2 MG/ML
4 INJECTION INTRAMUSCULAR; INTRAVENOUS EVERY 6 HOURS PRN
Status: DISCONTINUED | OUTPATIENT
Start: 2024-05-03 | End: 2024-05-03 | Stop reason: SDUPTHER

## 2024-05-03 RX ORDER — SIMETHICONE 80 MG
80 TABLET,CHEWABLE ORAL EVERY 6 HOURS PRN
Status: DISCONTINUED | OUTPATIENT
Start: 2024-05-03 | End: 2024-05-05 | Stop reason: HOSPADM

## 2024-05-03 RX ORDER — SODIUM CHLORIDE 9 MG/ML
INJECTION, SOLUTION INTRAVENOUS PRN
Status: DISCONTINUED | OUTPATIENT
Start: 2024-05-03 | End: 2024-05-05 | Stop reason: HOSPADM

## 2024-05-03 RX ORDER — ACETAMINOPHEN 325 MG/1
650 TABLET ORAL EVERY 4 HOURS PRN
Status: DISCONTINUED | OUTPATIENT
Start: 2024-05-03 | End: 2024-05-03

## 2024-05-03 RX ORDER — ACETAMINOPHEN 500 MG
1000 TABLET ORAL EVERY 8 HOURS SCHEDULED
Status: DISCONTINUED | OUTPATIENT
Start: 2024-05-03 | End: 2024-05-03

## 2024-05-03 RX ORDER — OXYCODONE HYDROCHLORIDE 5 MG/1
10 TABLET ORAL EVERY 4 HOURS PRN
Status: DISCONTINUED | OUTPATIENT
Start: 2024-05-03 | End: 2024-05-05 | Stop reason: HOSPADM

## 2024-05-03 RX ADMIN — DOCUSATE SODIUM 100 MG: 100 CAPSULE, LIQUID FILLED ORAL at 13:25

## 2024-05-03 RX ADMIN — Medication: at 13:25

## 2024-05-03 RX ADMIN — ROPIVACAINE HYDROCHLORIDE 8 ML/HR: 2 INJECTION, SOLUTION EPIDURAL; INFILTRATION at 04:57

## 2024-05-03 RX ADMIN — ROPIVACAINE HYDROCHLORIDE 4 ML: 2 INJECTION, SOLUTION EPIDURAL; INFILTRATION at 04:56

## 2024-05-03 RX ADMIN — OXYCODONE 5 MG: 5 TABLET ORAL at 15:52

## 2024-05-03 RX ADMIN — ACETAMINOPHEN 1000 MG: 500 TABLET, FILM COATED ORAL at 19:34

## 2024-05-03 RX ADMIN — Medication 87.3 MILLI-UNITS/MIN: at 09:46

## 2024-05-03 RX ADMIN — IBUPROFEN 800 MG: 800 TABLET, FILM COATED ORAL at 13:25

## 2024-05-03 RX ADMIN — BUTORPHANOL TARTRATE 1 MG: 2 INJECTION, SOLUTION INTRAMUSCULAR; INTRAVENOUS at 03:58

## 2024-05-03 RX ADMIN — SODIUM CHLORIDE, POTASSIUM CHLORIDE, SODIUM LACTATE AND CALCIUM CHLORIDE 1000 ML: 600; 310; 30; 20 INJECTION, SOLUTION INTRAVENOUS at 04:30

## 2024-05-03 RX ADMIN — SODIUM CHLORIDE, POTASSIUM CHLORIDE, SODIUM LACTATE AND CALCIUM CHLORIDE: 600; 310; 30; 20 INJECTION, SOLUTION INTRAVENOUS at 05:08

## 2024-05-03 RX ADMIN — WITCH HAZEL: 500 SOLUTION RECTAL; TOPICAL at 13:25

## 2024-05-03 RX ADMIN — ROPIVACAINE HYDROCHLORIDE 4 ML: 2 INJECTION, SOLUTION EPIDURAL; INFILTRATION at 04:54

## 2024-05-03 RX ADMIN — Medication 166.7 ML: at 08:30

## 2024-05-03 RX ADMIN — OXYTOCIN 166.7 ML: 10 INJECTION INTRAVENOUS at 08:30

## 2024-05-03 ASSESSMENT — PAIN DESCRIPTION - DESCRIPTORS: DESCRIPTORS: ACHING;CRAMPING

## 2024-05-03 ASSESSMENT — PAIN SCALES - GENERAL
PAINLEVEL_OUTOF10: 1
PAINLEVEL_OUTOF10: 10
PAINLEVEL_OUTOF10: 5

## 2024-05-03 ASSESSMENT — PAIN DESCRIPTION - LOCATION
LOCATION: ABDOMEN;PERINEUM
LOCATION: ABDOMEN

## 2024-05-03 NOTE — CARE COORDINATION
DSS report made by ER staff earlier today.  DSS declined to accept report (no follow-up).      1500:  SW attempted to meet with patient.  Patient sleeping and agreeable for  to come back in 1 hour.    1615:  SW met with patient and FOB.  Per patient, she and FOB reside together in [place] with her other 2 children.  Patient states that they have a strong/local support system whom are currently caring for her other 2 children while they are in the hospital.    Patient reports having a car seat, crib, and all needed items for .  Patient is currently receiving WIC.    Patient denies any history of generalized or postpartum depression/anxiety and states that she felt emotionally well during pregnancy.  Patient given informational packet on  mood & anxiety disorders (resources/education).    Family denies any additional needs from  at this time.  Family has 's contact information should any needs/questions arise.    Maura Jack, BRYAN-SHARI, [facility]-C  [facility]   [phone]

## 2024-05-03 NOTE — H&P
History & Physical    Name: Paris Bills MRN: [id number]  SSN: [id number]    YOB: 2000  Age: 24 y.o.  Sex: female      Subjective:     Reason for Triage visit:  39w0d and contractions    History of Present Illness: Ms. Bills is a 24 y.o.  female with an estimated gestational age of 39w0d with Estimated Date of Delivery: 5/10/24. Patient states that she began jer about 1-2 hours ago.  She states that they have gotten much more intense.  Pregnancy has been  complicated by thyroid disease, rh (-) and hx of thrombocytopenia. Patient denies chest pain, fever, headache , nausea and vomiting, right upper quadrant pain  , shortness of breath, swelling, vaginal bleeding , vaginal leaking of fluid , and visual disturbances.    OB History    Para Term  AB Living   5 2 1 1 2 2   SAB IAB Ectopic Molar Multiple Live Births   2       0 2      # Outcome Date GA Lbr Alejandro/2nd Weight Sex Delivery Anes PTL Lv   5 Current            4  23 36w5d 12:18 / 00:08 2.86 kg (6 lb 4.9 oz) M Vag-Spont EPI Y THAIS   3 Term 20 39w2d  3.05 kg (6 lb 11.6 oz) F Vag-Spont EPI N THAIS   2 SAB 2019 14w0d          1 SAB              Past Medical History:   Diagnosis Date    Anemia affecting pregnancy in third trimester 2023    Hashimoto's thyroiditis      Past Surgical History:   Procedure Laterality Date    DILATION AND CURETTAGE      DILATION AND CURETTAGE OF UTERUS  2019     Social History     Occupational History    Not on file   Tobacco Use    Smoking status: Never     Passive exposure: Never    Smokeless tobacco: Never   Substance and Sexual Activity    Alcohol use: Not Currently    Drug use: Never    Sexual activity: Not Currently     Partners: Male     Comment: With father of child      Family History   Problem Relation Age of Onset    Uterine Cancer Neg Hx     Thyroid Disease Paternal Grandmother         hyperthyroidism?    Thyroid Disease Sister

## 2024-05-03 NOTE — PROGRESS NOTES
Patient requests epidural for pain control. RN notified anesthesia team. Consents signed by patient

## 2024-05-03 NOTE — PROGRESS NOTES
EPIDURAL PLACEMENT      Dr Mullen at bedside at 0440.  ROHAN Melendez at bedside at 0440    Assisted pt to sitting up on bedside at 0443.    Timeout completed at 0444 with MD, ROHAN and myself at bedside.    Test dose given at 0449.  Negative reaction.    Dose given at 0454.    Pt assisted to lying back in left tilt position.    See anesthesia record for details.  See vital sign flow sheet for BP.    Tolerated procedure well.

## 2024-05-03 NOTE — L&D DELIVERY NOTE
Called by RN that patient was feeling pressure and noted to be complete/ +3. At 08:26AM, this 24 y.o.  at 39w0d delivered a vigorous female  infant from vtx presentation to a sterile field under epidural anesthesia. Cord clamped x 2, cut. Infant bulb suctioned and handed to RN. APGARs 8 at 1 minute and 9 at 5 minutes. Wt 2960g. 3VC/SI placenta delivered. 30U Pitocin given IV. Fundus firm. Bleeding scant.  1st degree posterior midline laceration repaired in normal fashion with 3-0 vicryl. No packs in vagina. EBL 200cc. Mom and infant stable in LDR.     Staff: Dr. Dorantes, Girl Paris [[id number]]      Labor Events     Labor: No   Steroids: None  Cervical Ripening Date/Time:      Antibiotics Received during Labor: No  Rupture Date/Time:  5/3/24 07:20:00   Rupture Type: SROM  Fluid Color: Clear  Fluid Odor: None  Fluid Volume: Moderate  Labor Complications: None              Anesthesia    Method: Epidural       Labor Event Times      Labor onset date/time:  5/3/24 02:39:00     Dilation complete date/time:  5/3/24 08:20:00     Start pushing date/time:  5/3/2024 08:24:00   Decision date/time (emergent ):            Delivery Details      Delivery Date: 5/3/24 Delivery Time: 08:25:00   Delivery Type: Vaginal, Spontaneous               Presentation    Presentation: Vertex       Assisted Delivery Details    Forceps Attempted?: No  Vacuum Extractor Attempted?: No                           Cord    Vessels: 3 Vessels  Complications: None  Delayed Cord Clamping?: Yes  Cord Clamped Date/Time: 5/3/2024 08:26:00  Cord Blood Disposition: Lab  Gases Sent?: No              Placenta    Date/Time: 5/3/2024 08:30:00  Removal: Spontaneous  Appearance: Intact  Disposition: Discarded       Lacerations    Episiotomy: None  Perineal Lacerations: 1st  Other Lacerations: no non-perineal laceration  Number of Repair Packets: 1       Blood Loss  Mother: Paris Bills S #[id number]

## 2024-05-03 NOTE — PROGRESS NOTES
Pt states concern for her 2 younger children since she has not heard from the FOB of current baby who is to be watching her children. Pt asks if we could go and check the parking lot for his car. Security was called and made aware of the patients concerns, security stated they would go check the parking lot.     While waiting for a return call from security, a rapid response was called to the entrance C parking lot. When myself and 2 others responded to the rapid, we saw security with the FOB as well as the patients 2 children in the back seat. The 2 children were removed from the car and appeared tearful and soiled. Nursing supervisor and ER staff escorted FOB with the patients 2 children to the ER to be assessed.     Pt was updated on the events that occurred and she states understanding and has no questions or concerns.

## 2024-05-03 NOTE — ANESTHESIA PROCEDURE NOTES
Epidural Block    Patient location during procedure: OB  Start time: 5/3/2024 4:44 AM  End time: 5/3/2024 4:50 AM  Reason for block: labor epidural and at surgeon's request  Staffing  Performed: anesthesiologist   Anesthesiologist: Rome Mullen MD  Performed by: Rome Mullen MD  Authorized by: Rome Mullen MD    Epidural  Patient position: sitting  Prep: ChloraPrep  Patient monitoring: frequent blood pressure checks  Approach: midline  Location: L3-4  Injection technique: REBECCA saline  Provider prep: mask and sterile gloves  Needle  Needle type: Tuohy   Needle gauge: 17 G  Needle insertion depth: 6.5 cm  Catheter type: multi-orifice  Catheter size: 19 G  Catheter at skin depth: 10 cm  Test dose: negativeCatheter Secured: tegaderm and tape  Assessment  Hemodynamics: stable  Attempts: 1  Outcomes: patient tolerated procedure well and uncomplicated  Preanesthetic Checklist  Completed: patient identified, IV checked, site marked, risks and benefits discussed, surgical/procedural consents, equipment checked, pre-op evaluation, timeout performed, anesthesia consent given, oxygen available and monitors applied/VS acknowledged

## 2024-05-03 NOTE — ANESTHESIA PRE PROCEDURE
Lois Beverly APRN - CRNA   4 mL at 24 0454       Allergies:  No Known Allergies    Problem List:    Patient Active Problem List   Diagnosis Code    Hashimoto's thyroiditis E06.3    Rubella non-immune status, antepartum O09.899, Z28.39    Thyroid disease during pregnancy in third trimester O99.283, E07.9    Rh negative state in antepartum period O26.899, Z67.91    Multigravida in third trimester Z34.83    History of polyhydramnios Z87.59    Short interval between pregnancies affecting pregnancy, antepartum O09.899    Hx of  delivery, currently pregnant, third trimester O09.893    Diarrhea R19.7    Thrombocytopenia affecting pregnancy (HCC) O99.119, D69.6    Normal labor O80, Z37.9       Past Medical History:        Diagnosis Date    Anemia affecting pregnancy in third trimester 2023    Hashimoto's thyroiditis        Past Surgical History:        Procedure Laterality Date    DILATION AND CURETTAGE      DILATION AND CURETTAGE OF UTERUS  2019       Social History:    Social History     Tobacco Use    Smoking status: Never     Passive exposure: Never    Smokeless tobacco: Never   Substance Use Topics    Alcohol use: Not Currently                                Counseling given: Not Answered      Vital Signs (Current):   Vitals:    24 0451 24 0452 24 0453 24 0454   BP: 123/78 119/70 (!) 119/55 127/78   Pulse: 88 98 86 100   Resp:       Temp:       TempSrc:       SpO2:       Weight:       Height:                                                  BP Readings from Last 3 Encounters:   24 127/78   24 124/80   24 (!) 109/57       NPO Status:                                                                                 BMI:   Wt Readings from Last 3 Encounters:   24 84.8 kg (187 lb)   24 85 kg (187 lb 6.4 oz)   24 84.4 kg (186 lb)     Body mass index is 35.33 kg/m².    CBC:   Lab Results   Component Value Date/Time    WBC 10.0

## 2024-05-04 LAB — HGB BLD-MCNC: 10.1 G/DL (ref 11.7–15.4)

## 2024-05-04 PROCEDURE — 1100000000 HC RM PRIVATE

## 2024-05-04 PROCEDURE — 6370000000 HC RX 637 (ALT 250 FOR IP): Performed by: OBSTETRICS & GYNECOLOGY

## 2024-05-04 PROCEDURE — 36415 COLL VENOUS BLD VENIPUNCTURE: CPT

## 2024-05-04 PROCEDURE — 85018 HEMOGLOBIN: CPT

## 2024-05-04 RX ORDER — IBUPROFEN 600 MG/1
600 TABLET ORAL 3 TIMES DAILY PRN
Qty: 30 TABLET | Refills: 0 | Status: SHIPPED | OUTPATIENT
Start: 2024-05-04

## 2024-05-04 RX ORDER — DOCUSATE SODIUM 100 MG/1
100 CAPSULE, LIQUID FILLED ORAL DAILY PRN
Qty: 30 CAPSULE | Refills: 0 | Status: SHIPPED | OUTPATIENT
Start: 2024-05-04

## 2024-05-04 RX ADMIN — ACETAMINOPHEN 1000 MG: 500 TABLET, FILM COATED ORAL at 19:29

## 2024-05-04 RX ADMIN — IBUPROFEN 800 MG: 800 TABLET, FILM COATED ORAL at 14:50

## 2024-05-04 RX ADMIN — IBUPROFEN 800 MG: 800 TABLET, FILM COATED ORAL at 01:59

## 2024-05-04 RX ADMIN — ACETAMINOPHEN 1000 MG: 500 TABLET, FILM COATED ORAL at 08:12

## 2024-05-04 NOTE — ANESTHESIA POSTPROCEDURE EVALUATION
Department of Anesthesiology  Postprocedure Note    Patient: Paris Bills  MRN: [id number]  YOB: 2000  Date of evaluation: 5/4/2024    Procedure Summary       Date: 05/03/24 Room / Location:     Anesthesia Start: 0438 Anesthesia Stop: 0825    Procedure: Labor Analgesia Diagnosis:     Scheduled Providers:  Responsible Provider: Ailin Jarrett MD    Anesthesia Type: epidural ASA Status: 2            Anesthesia Type: No value filed.    Yasir Phase I:      Yasir Phase II:      Anesthesia Post Evaluation    Patient location during evaluation: PACU  Patient participation: complete - patient participated  Level of consciousness: awake and alert  Airway patency: patent  Nausea & Vomiting: no nausea  Cardiovascular status: hemodynamically stable  Respiratory status: acceptable  Hydration status: euvolemic  Comments: Comfortable for delivery.  Pain management: adequate and satisfactory to patient        No notable events documented.

## 2024-05-04 NOTE — PLAN OF CARE
Problem: Pain  Goal: Verbalizes/displays adequate comfort level or baseline comfort level  5/3/2024 2020 by Guerita Castanon RN  Outcome: Progressing  Flowsheets (Taken 5/3/2024 1320 by Nicanor Jasmine, RN)  Verbalizes/displays adequate comfort level or baseline comfort level:   Encourage patient to monitor pain and request assistance   Assess pain using appropriate pain scale   Administer analgesics based on type and severity of pain and evaluate response   Implement non-pharmacological measures as appropriate and evaluate response   Consider cultural and social influences on pain and pain management   Notify Licensed Independent Practitioner if interventions unsuccessful or patient reports new pain  5/3/2024 1305 by Nicanor Jasmine RN  Outcome: Progressing     Problem: Postpartum  Goal: Experiences normal postpartum course  Description:  Postpartum OB-Pregnancy care plan goal which identifies if the mother is experiencing a normal postpartum course  5/3/2024 2020 by Guerita Castanon RN  Outcome: Progressing  5/3/2024 1305 by Nicanor Jasmine RN  Outcome: Progressing  Goal: Appropriate maternal -  bonding  Description:  Postpartum OB-Pregnancy care plan goal which identifies if the mother and  are bonding appropriately  5/3/2024 2020 by Guerita Castanon RN  Outcome: Progressing  5/3/2024 1305 by Nicanor Jasmine RN  Outcome: Progressing  Goal: Establishment of infant feeding pattern  Description:  Postpartum OB-Pregnancy care plan goal which identifies if the mother is establishing a feeding pattern with their   5/3/2024 2020 by Guerita Castanon RN  Outcome: Progressing  5/3/2024 1305 by Nicanor Jasmine RN  Outcome: Progressing  Goal: Incisions, wounds, or drain sites healing without S/S of infection  5/3/2024 2020 by Guerita Castanon RN  Outcome: Progressing  5/3/2024 1305 by Nicanor Jasmine RN  Outcome: Progressing     Problem: Infection - Adult  Goal: Absence of infection at

## 2024-05-04 NOTE — DISCHARGE SUMMARY
Obstetric Discharge Summary    Admitting Diagnosis  Ms. Bills is a 24 y.o.  female with an estimated gestational age of 39w0d  who presents in labor. She had an uncomplicated vaginal and delivery. Meeting all goals postpartum.    OB History          5    Para   3    Term   2       1    AB   2    Living   3         SAB   2    IAB        Ectopic        Molar        Multiple   0    Live Births   3                Reasons for Admission on 5/3/2024  2:25 AM  Normal labor [O80, Z37.9]  No comment available  Onset of Labor    Prenatal Procedures  None    Intrapartum Procedures        Multiple birth?: No        Spontaneous Vaginal Delivery: See Labor and Delivery Summary       Postpartum Procedures  None    Postpartum/Operative Complications        Data  Information for the patient's :  Loren Bills [[id number]]   female   Birth Weight: 2.96 kg (6 lb 8.4 oz)   Discharge With Mother  Complications: No    Discharge Diagnosis       Discharge Information  Current Discharge Medication List        START taking these medications    Details   ibuprofen (ADVIL;MOTRIN) 600 MG tablet Take 1 tablet by mouth 3 times daily as needed for Pain  Qty: 30 tablet, Refills: 0           CONTINUE these medications which have CHANGED    Details   docusate sodium (COLACE) 100 MG capsule Take 1 capsule by mouth daily as needed for Constipation  Qty: 30 capsule, Refills: 0           CONTINUE these medications which have NOT CHANGED    Details   ferrous sulfate (IRON 325) 325 (65 Fe) MG tablet Take 1 tablet by mouth daily  Qty: 30 tablet, Refills: 6      Prenatal Vit-Fe Fumarate-FA (PRENATAL VITAMINS) 28-0.8 MG TABS Take 1 tablet by mouth daily  Qty: 90 tablet, Refills: 3             No discharge procedures on file.    Discharge to: Home  Follow up in 6 weeks    Discharge Date: 2024      Comments

## 2024-05-04 NOTE — PROGRESS NOTES
PPD 1 , at 39w0d, presented in labor    S:  Pt doing well this AM.  Pain controlled w/po meds.  Lochia scant.  + Ambulation.  Tolerating regular diet.        BP (!) 99/58 Comment: RN Notified  Pulse 87   Temp 98.3 °F (36.8 °C) (Oral)   Resp 18   Ht 1.549 m (5' 1\")   Wt 84.8 kg (187 lb)   LMP 2023   SpO2 99%   Breastfeeding Unknown   BMI 35.33 kg/m²      Physical exam:  HEENT: NCAT   Cardiovascular: RRR  Respiratory: Normal effort  Abdomen: fundus firm, non-distended   Ext: trace edema, no calf tenderness    A/P:   Hb 11.1 -> 10.1  Continue routine pp care   Anticipate dc today

## 2024-05-05 VITALS
HEART RATE: 60 BPM | DIASTOLIC BLOOD PRESSURE: 85 MMHG | WEIGHT: 187 LBS | RESPIRATION RATE: 16 BRPM | BODY MASS INDEX: 35.3 KG/M2 | HEIGHT: 61 IN | OXYGEN SATURATION: 99 % | TEMPERATURE: 98.4 F | SYSTOLIC BLOOD PRESSURE: 128 MMHG

## 2024-05-05 NOTE — PROGRESS NOTES
Patient discharged to home per MD orders.  Discharge instructions reviewed with patient and pt given a copy. Questions encouraged and answered. Patient verbalizes understanding. Patient escorted by [facility] staff to private vehicle, pt declined w/c. Stable at discharge.

## 2024-05-05 NOTE — PROGRESS NOTES
Shift assessment complete see flowsheet. Discussed today plan of care with pt. Bleeding precautions given. No s/s of distress noted at this time. Questions encouraged and answered. Pt to call with needs/concerns. Pt in bed with call light in reach

## 2024-05-05 NOTE — PROGRESS NOTES
05/05/24 0852   AVS Reviewed   AVS & discharge instructions reviewed with patient and/or representative? Yes   Reviewed instructions with Patient   Level of Understanding Questions answered;Verbalized understanding

## 2024-05-05 NOTE — PROGRESS NOTES
[facility].             May 5, 2024       RE: Paris Bills      To Whom it May Concern:        This is to certify that Paris Bills has been in the hospital from 5/2/24 to 5/5/24.      Sincerely,    Guerita Castanon RN

## 2024-05-05 NOTE — PROGRESS NOTES
PPD 2 , at 39w0d, presented in labor    S:  Pt doing well this AM.  Pain controlled w/po meds.  Lochia scant.  + Ambulation.  Tolerating regular diet.        /63   Pulse 71   Temp 98.1 °F (36.7 °C) (Oral)   Resp 18   Ht 1.549 m (5' 1\")   Wt 84.8 kg (187 lb)   LMP 2023   SpO2 99%   Breastfeeding Unknown   BMI 35.33 kg/m²      Physical exam:  HEENT: NCAT   Cardiovascular: RRR  Respiratory: Normal effort  Abdomen: fundus firm, non-distended   Ext: trace edema, no calf tenderness    A/P:   Hb 11.1 -> 10.1  Continue routine pp care   Ready to go home. Anticipate dc today

## 2024-06-17 ENCOUNTER — POSTPARTUM VISIT (OUTPATIENT)
Dept: OBGYN CLINIC | Age: 24
End: 2024-06-17
Payer: MEDICAID

## 2024-06-17 VITALS
DIASTOLIC BLOOD PRESSURE: 80 MMHG | BODY MASS INDEX: 32.66 KG/M2 | HEIGHT: 61 IN | WEIGHT: 173 LBS | SYSTOLIC BLOOD PRESSURE: 124 MMHG

## 2024-06-17 PROBLEM — Z87.59 HISTORY OF POLYHYDRAMNIOS: Status: RESOLVED | Noted: 2023-11-17 | Resolved: 2024-06-17

## 2024-06-17 PROBLEM — R19.7 DIARRHEA: Status: RESOLVED | Noted: 2024-04-24 | Resolved: 2024-06-17

## 2024-06-17 PROBLEM — Z67.91 RH NEGATIVE STATE IN ANTEPARTUM PERIOD: Status: RESOLVED | Noted: 2019-11-22 | Resolved: 2024-06-17

## 2024-06-17 PROBLEM — E07.9 THYROID DISEASE DURING PREGNANCY IN THIRD TRIMESTER: Status: RESOLVED | Noted: 2019-11-20 | Resolved: 2024-06-17

## 2024-06-17 PROBLEM — Z34.83 MULTIGRAVIDA IN THIRD TRIMESTER: Status: RESOLVED | Noted: 2023-11-17 | Resolved: 2024-06-17

## 2024-06-17 PROBLEM — O09.893 HX OF PRETERM DELIVERY, CURRENTLY PREGNANT, THIRD TRIMESTER: Status: RESOLVED | Noted: 2023-12-18 | Resolved: 2024-06-17

## 2024-06-17 PROBLEM — Z37.9 NORMAL LABOR: Status: RESOLVED | Noted: 2024-05-03 | Resolved: 2024-06-17

## 2024-06-17 PROBLEM — O09.899 SHORT INTERVAL BETWEEN PREGNANCIES AFFECTING PREGNANCY, ANTEPARTUM: Status: RESOLVED | Noted: 2023-11-17 | Resolved: 2024-06-17

## 2024-06-17 PROBLEM — O26.899 RH NEGATIVE STATE IN ANTEPARTUM PERIOD: Status: RESOLVED | Noted: 2019-11-22 | Resolved: 2024-06-17

## 2024-06-17 PROBLEM — O09.899 RUBELLA NON-IMMUNE STATUS, ANTEPARTUM: Status: RESOLVED | Noted: 2019-11-22 | Resolved: 2024-06-17

## 2024-06-17 PROBLEM — Z28.39 RUBELLA NON-IMMUNE STATUS, ANTEPARTUM: Status: RESOLVED | Noted: 2019-11-22 | Resolved: 2024-06-17

## 2024-06-17 PROBLEM — O99.283 THYROID DISEASE DURING PREGNANCY IN THIRD TRIMESTER: Status: RESOLVED | Noted: 2019-11-20 | Resolved: 2024-06-17

## 2024-06-17 PROBLEM — O99.119 THROMBOCYTOPENIA AFFECTING PREGNANCY (HCC): Status: RESOLVED | Noted: 2024-04-24 | Resolved: 2024-06-17

## 2024-06-17 PROBLEM — D69.6 THROMBOCYTOPENIA AFFECTING PREGNANCY (HCC): Status: RESOLVED | Noted: 2024-04-24 | Resolved: 2024-06-17

## 2024-06-17 RX ORDER — MEDROXYPROGESTERONE ACETATE 150 MG/ML
150 INJECTION, SUSPENSION INTRAMUSCULAR
Qty: 1 ML | Refills: 3 | Status: SHIPPED | OUTPATIENT
Start: 2024-06-17

## 2024-06-17 NOTE — PATIENT INSTRUCTIONS
Please come back as scheduled for your Depo shot then every 12-13 weeks  Follow up as needed or next year for your yearly female exam.  Thanks for coming to see us today and letting us take care of you!

## 2024-06-17 NOTE — PROGRESS NOTES
Patient comes in today for 6 week post partum visit.      Delivery Method: Vaginal     Delivery Date: 05/03/2024    Birth Control: none - would like to discuss options     Breast/Bottle: bottle     Bleeding: spotting today     Baby: Doing well    Bowel/Bladder: No issues     Blues: None     Last pap smear:  10/11/2022 negative     Vitals:    06/17/24 1358   BP: 124/80        Dayna Gonzalez MA  2:03 PM  06/17/24

## 2024-06-17 NOTE — ASSESSMENT & PLAN NOTE
Exam as above  Encouraged annual exams with paps as indicated  Pt to F/U with PCP for all non-gyn health related issues

## 2024-06-17 NOTE — PROGRESS NOTES
[facility]  [address], [place] [address]  [phone]    Raza Benedict MD, FACOG  [facility]  Fabi Crawford MD, FACOG    6 week Postpartum Office Visit    Paris Bills is a 24 y.o.  that presents for PP visit today    Delivery Method: Vaginal      Delivery Date: 2024     Birth Control: none - desires Depo (Rx sent)     Breast/Bottle: bottle      Bleeding: spotting today      Baby: Doing well     Bowel/Bladder: No issues      Blues: None      Last pap smear:  10/11/2022 negative     OB History    Para Term  AB Living   5 3 2 1 2 3   SAB IAB Ectopic Molar Multiple Live Births   2       0 3      # Outcome Date GA Lbr Alejandro/2nd Weight Sex Delivery Anes PTL Lv   5 Term 24 39w0d 05:41 / 00:05 2.96 kg (6 lb 8.4 oz) F Vag-Spont EPI N THAIS   4  23 36w5d 12:18 / 00:08 2.86 kg (6 lb 4.9 oz) M Vag-Spont EPI Y THAIS   3 Term 20 39w2d  3.05 kg (6 lb 11.6 oz) F Vag-Spont EPI N THAIS   2 SAB 2019 14w0d          1 SAB                 Past Medical History:   Diagnosis Date    Anemia affecting pregnancy in third trimester 2023    Hashimoto's thyroiditis        Past Surgical History:   Procedure Laterality Date    DILATION AND CURETTAGE      DILATION AND CURETTAGE OF UTERUS  2019        Social History     Socioeconomic History    Marital status: Single     Spouse name: Not on file    Number of children: Not on file    Years of education: Not on file    Highest education level: Not on file   Occupational History    Not on file   Tobacco Use    Smoking status: Never     Passive exposure: Never    Smokeless tobacco: Never   Substance and Sexual Activity    Alcohol use: Not Currently    Drug use: Never    Sexual activity: Not Currently     Partners: Male     Comment: With father of child   Other Topics Concern    Not on file   Social History Narrative    Abuse: Feels safe at home, no history of physical abuse, no history of sexual abuse